# Patient Record
Sex: FEMALE | Race: WHITE | Employment: FULL TIME
[De-identification: names, ages, dates, MRNs, and addresses within clinical notes are randomized per-mention and may not be internally consistent; named-entity substitution may affect disease eponyms.]

---

## 2024-04-01 ENCOUNTER — APPOINTMENT (OUTPATIENT)
Facility: HOSPITAL | Age: 54
DRG: 623 | End: 2024-04-01
Payer: COMMERCIAL

## 2024-04-01 ENCOUNTER — HOSPITAL ENCOUNTER (EMERGENCY)
Facility: HOSPITAL | Age: 54
Discharge: HOME | DRG: 623 | End: 2024-04-01
Attending: EMERGENCY MEDICINE
Payer: COMMERCIAL

## 2024-04-01 VITALS
HEIGHT: 62 IN | SYSTOLIC BLOOD PRESSURE: 114 MMHG | HEART RATE: 98 BPM | WEIGHT: 220 LBS | RESPIRATION RATE: 18 BRPM | DIASTOLIC BLOOD PRESSURE: 77 MMHG | TEMPERATURE: 97.7 F | OXYGEN SATURATION: 97 % | BODY MASS INDEX: 40.48 KG/M2

## 2024-04-01 DIAGNOSIS — L02.91 ABSCESS: Primary | ICD-10-CM

## 2024-04-01 LAB
ALBUMIN SERPL-MCNC: 2.7 G/DL (ref 3.5–5)
ALBUMIN/GLOB SERPL: 0.8 (ref 1.1–2.2)
ALP SERPL-CCNC: 127 U/L (ref 45–117)
ALT SERPL-CCNC: 44 U/L (ref 12–78)
ANION GAP SERPL CALC-SCNC: 10 MMOL/L (ref 5–15)
AST SERPL-CCNC: 33 U/L (ref 15–37)
BASOPHILS # BLD: 0 K/UL (ref 0–0.1)
BASOPHILS NFR BLD: 0 % (ref 0–1)
BILIRUB SERPL-MCNC: 0.7 MG/DL (ref 0.2–1)
BUN SERPL-MCNC: 17 MG/DL (ref 6–20)
BUN/CREAT SERPL: 21 (ref 12–20)
CALCIUM SERPL-MCNC: 9.2 MG/DL (ref 8.5–10.1)
CHLORIDE SERPL-SCNC: 101 MMOL/L (ref 97–108)
CO2 SERPL-SCNC: 24 MMOL/L (ref 21–32)
CREAT SERPL-MCNC: 0.81 MG/DL (ref 0.55–1.02)
DIFFERENTIAL METHOD BLD: ABNORMAL
EOSINOPHIL # BLD: 0 K/UL (ref 0–0.4)
EOSINOPHIL NFR BLD: 0 % (ref 0–7)
ERYTHROCYTE [DISTWIDTH] IN BLOOD BY AUTOMATED COUNT: 12.6 % (ref 11.5–14.5)
GLOBULIN SER CALC-MCNC: 3.6 G/DL (ref 2–4)
GLUCOSE SERPL-MCNC: 442 MG/DL (ref 65–100)
HCT VFR BLD AUTO: 42 % (ref 35–47)
HGB BLD-MCNC: 14.7 G/DL (ref 11.5–16)
IMM GRANULOCYTES # BLD AUTO: 0 K/UL
IMM GRANULOCYTES NFR BLD AUTO: 0 %
LACTATE SERPL-SCNC: 1.2 MMOL/L (ref 0.4–2)
LYMPHOCYTES # BLD: 1.4 K/UL (ref 0.8–3.5)
LYMPHOCYTES NFR BLD: 16 % (ref 12–49)
MCH RBC QN AUTO: 30.3 PG (ref 26–34)
MCHC RBC AUTO-ENTMCNC: 35 G/DL (ref 30–36.5)
MCV RBC AUTO: 86.6 FL (ref 80–99)
MONOCYTES # BLD: 1.2 K/UL (ref 0–1)
MONOCYTES NFR BLD: 14 % (ref 5–13)
NEUTS BAND NFR BLD MANUAL: 5 % (ref 0–6)
NEUTS SEG # BLD: 6.2 K/UL (ref 1.8–8)
NEUTS SEG NFR BLD: 65 % (ref 32–75)
NRBC # BLD: 0 K/UL (ref 0–0.01)
NRBC BLD-RTO: 0 PER 100 WBC
PLATELET # BLD AUTO: 308 K/UL (ref 150–400)
PMV BLD AUTO: 10.6 FL (ref 8.9–12.9)
POTASSIUM SERPL-SCNC: 4.1 MMOL/L (ref 3.5–5.1)
PROT SERPL-MCNC: 6.3 G/DL (ref 6.4–8.2)
RBC # BLD AUTO: 4.85 M/UL (ref 3.8–5.2)
RBC MORPH BLD: ABNORMAL
SODIUM SERPL-SCNC: 135 MMOL/L (ref 136–145)
WBC # BLD AUTO: 8.8 K/UL (ref 3.6–11)

## 2024-04-01 PROCEDURE — 96360 HYDRATION IV INFUSION INIT: CPT

## 2024-04-01 PROCEDURE — 74177 CT ABD & PELVIS W/CONTRAST: CPT

## 2024-04-01 PROCEDURE — 99285 EMERGENCY DEPT VISIT HI MDM: CPT

## 2024-04-01 PROCEDURE — 2580000003 HC RX 258: Performed by: EMERGENCY MEDICINE

## 2024-04-01 PROCEDURE — 6360000004 HC RX CONTRAST MEDICATION: Performed by: EMERGENCY MEDICINE

## 2024-04-01 PROCEDURE — 83605 ASSAY OF LACTIC ACID: CPT

## 2024-04-01 PROCEDURE — 85025 COMPLETE CBC W/AUTO DIFF WBC: CPT

## 2024-04-01 PROCEDURE — 10061 I&D ABSCESS COMP/MULTIPLE: CPT

## 2024-04-01 PROCEDURE — 80053 COMPREHEN METABOLIC PANEL: CPT

## 2024-04-01 PROCEDURE — 6370000000 HC RX 637 (ALT 250 FOR IP): Performed by: EMERGENCY MEDICINE

## 2024-04-01 PROCEDURE — 2500000003 HC RX 250 WO HCPCS: Performed by: EMERGENCY MEDICINE

## 2024-04-01 PROCEDURE — 0Y913ZZ DRAINAGE OF LEFT BUTTOCK, PERCUTANEOUS APPROACH: ICD-10-PCS | Performed by: EMERGENCY MEDICINE

## 2024-04-01 PROCEDURE — 36415 COLL VENOUS BLD VENIPUNCTURE: CPT

## 2024-04-01 RX ORDER — AMOXICILLIN AND CLAVULANATE POTASSIUM 875; 125 MG/1; MG/1
1 TABLET, FILM COATED ORAL 2 TIMES DAILY
Status: ON HOLD | COMMUNITY
End: 2024-04-05 | Stop reason: HOSPADM

## 2024-04-01 RX ORDER — DOXYCYCLINE HYCLATE 100 MG
100 TABLET ORAL 2 TIMES DAILY
Status: ON HOLD | COMMUNITY
End: 2024-04-05 | Stop reason: HOSPADM

## 2024-04-01 RX ORDER — LIDOCAINE HYDROCHLORIDE 10 MG/ML
5 INJECTION, SOLUTION EPIDURAL; INFILTRATION; INTRACAUDAL; PERINEURAL ONCE
Status: COMPLETED | OUTPATIENT
Start: 2024-04-01 | End: 2024-04-01

## 2024-04-01 RX ORDER — 0.9 % SODIUM CHLORIDE 0.9 %
1000 INTRAVENOUS SOLUTION INTRAVENOUS ONCE
Status: COMPLETED | OUTPATIENT
Start: 2024-04-01 | End: 2024-04-01

## 2024-04-01 RX ORDER — NAPROXEN 375 MG/1
375 TABLET ORAL 2 TIMES DAILY WITH MEALS
COMMUNITY

## 2024-04-01 RX ADMIN — INSULIN HUMAN 8 UNITS: 100 INJECTION, SOLUTION PARENTERAL at 11:55

## 2024-04-01 RX ADMIN — LIDOCAINE HYDROCHLORIDE 5 ML: 10 INJECTION, SOLUTION EPIDURAL; INFILTRATION; INTRACAUDAL; PERINEURAL at 09:18

## 2024-04-01 RX ADMIN — IOPAMIDOL 100 ML: 755 INJECTION, SOLUTION INTRAVENOUS at 11:35

## 2024-04-01 RX ADMIN — SODIUM CHLORIDE 1000 ML: 9 INJECTION, SOLUTION INTRAVENOUS at 11:18

## 2024-04-01 ASSESSMENT — PAIN - FUNCTIONAL ASSESSMENT
PAIN_FUNCTIONAL_ASSESSMENT: 0-10
PAIN_FUNCTIONAL_ASSESSMENT: ACTIVITIES ARE NOT PREVENTED

## 2024-04-01 ASSESSMENT — PAIN DESCRIPTION - FREQUENCY: FREQUENCY: CONTINUOUS

## 2024-04-01 ASSESSMENT — PAIN DESCRIPTION - DESCRIPTORS
DESCRIPTORS: STABBING;SHARP
DESCRIPTORS: STABBING

## 2024-04-01 ASSESSMENT — PAIN DESCRIPTION - LOCATION
LOCATION: BUTTOCKS
LOCATION: BUTTOCKS

## 2024-04-01 ASSESSMENT — PAIN DESCRIPTION - ONSET: ONSET: ON-GOING

## 2024-04-01 ASSESSMENT — PAIN DESCRIPTION - ORIENTATION
ORIENTATION: LEFT
ORIENTATION: LEFT

## 2024-04-01 ASSESSMENT — PAIN SCALES - GENERAL
PAINLEVEL_OUTOF10: 7
PAINLEVEL_OUTOF10: 9

## 2024-04-01 ASSESSMENT — PAIN DESCRIPTION - PAIN TYPE: TYPE: ACUTE PAIN

## 2024-04-01 NOTE — ED TRIAGE NOTES
[facility] 15. Pt ambulatory to room. Last Monday-Wednesday pt started to have flu like symptoms. Pt noticed an abscess that kept growing on her left buttocks that is draining . On Friday the pt went to an urgent care and was given antibiotics and told to make an appointment for a later time.

## 2024-04-01 NOTE — ED PROVIDER NOTES
[place].  Sampson Adkins MD  12:43 PM      DISPOSITION/PLAN   DISPOSITION        PATIENT REFERRED TO:  No follow-up provider specified.    DISCHARGE MEDICATIONS:  New Prescriptions    No medications on file         (Please note that portions of this note were completed with a voice recognition program.  Efforts were made to edit the dictations but occasionally words are mis-transcribed.)    Sampson Adkins MD (electronically signed)  Emergency Attending Physician / Physician Assistant / Nurse Practitioner             Sampson Adkins MD  04/01/24 4007

## 2024-04-03 ENCOUNTER — APPOINTMENT (OUTPATIENT)
Facility: HOSPITAL | Age: 54
DRG: 623 | End: 2024-04-03
Payer: COMMERCIAL

## 2024-04-03 ENCOUNTER — HOSPITAL ENCOUNTER (INPATIENT)
Facility: HOSPITAL | Age: 54
LOS: 2 days | Discharge: HOME | DRG: 623 | End: 2024-04-05
Attending: STUDENT IN AN ORGANIZED HEALTH CARE EDUCATION/TRAINING PROGRAM | Admitting: INTERNAL MEDICINE
Payer: COMMERCIAL

## 2024-04-03 DIAGNOSIS — E11.65 TYPE 2 DIABETES MELLITUS WITH HYPERGLYCEMIA (HCC): ICD-10-CM

## 2024-04-03 DIAGNOSIS — L03.119 CELLULITIS AND ABSCESS OF LEG: Primary | ICD-10-CM

## 2024-04-03 DIAGNOSIS — L02.419 CELLULITIS AND ABSCESS OF LEG: Primary | ICD-10-CM

## 2024-04-03 PROBLEM — L98.429 SACRAL ULCER (HCC): Status: ACTIVE | Noted: 2024-04-03

## 2024-04-03 PROBLEM — L02.91 ABSCESS: Status: ACTIVE | Noted: 2024-04-03

## 2024-04-03 PROBLEM — R73.9 HYPERGLYCEMIA: Status: ACTIVE | Noted: 2024-04-03

## 2024-04-03 LAB
ALBUMIN SERPL-MCNC: 2.8 G/DL (ref 3.5–5)
ALBUMIN/GLOB SERPL: 0.8 (ref 1.1–2.2)
ALP SERPL-CCNC: 110 U/L (ref 45–117)
ALT SERPL-CCNC: 51 U/L (ref 12–78)
ANION GAP SERPL CALC-SCNC: 10 MMOL/L (ref 5–15)
AST SERPL-CCNC: 33 U/L (ref 15–37)
BASOPHILS # BLD: 0 K/UL (ref 0–0.1)
BASOPHILS NFR BLD: 0 % (ref 0–1)
BILIRUB SERPL-MCNC: 0.5 MG/DL (ref 0.2–1)
BUN SERPL-MCNC: 15 MG/DL (ref 6–20)
BUN/CREAT SERPL: 17 (ref 12–20)
CALCIUM SERPL-MCNC: 8.9 MG/DL (ref 8.5–10.1)
CHLORIDE SERPL-SCNC: 99 MMOL/L (ref 97–108)
CO2 SERPL-SCNC: 25 MMOL/L (ref 21–32)
CREAT SERPL-MCNC: 0.88 MG/DL (ref 0.55–1.02)
DIFFERENTIAL METHOD BLD: ABNORMAL
EOSINOPHIL # BLD: 0.1 K/UL (ref 0–0.4)
EOSINOPHIL NFR BLD: 1 % (ref 0–7)
ERYTHROCYTE [DISTWIDTH] IN BLOOD BY AUTOMATED COUNT: 12.5 % (ref 11.5–14.5)
GLOBULIN SER CALC-MCNC: 3.6 G/DL (ref 2–4)
GLUCOSE BLD STRIP.AUTO-MCNC: 267 MG/DL (ref 65–117)
GLUCOSE BLD STRIP.AUTO-MCNC: 280 MG/DL (ref 65–117)
GLUCOSE BLD STRIP.AUTO-MCNC: 295 MG/DL (ref 65–117)
GLUCOSE SERPL-MCNC: 401 MG/DL (ref 65–100)
HCT VFR BLD AUTO: 45.9 % (ref 35–47)
HGB BLD-MCNC: 16 G/DL (ref 11.5–16)
IMM GRANULOCYTES # BLD AUTO: 0 K/UL
IMM GRANULOCYTES NFR BLD AUTO: 0 %
LACTATE SERPL-SCNC: 1.1 MMOL/L (ref 0.4–2)
LYMPHOCYTES # BLD: 2.4 K/UL (ref 0.8–3.5)
LYMPHOCYTES NFR BLD: 28 % (ref 12–49)
MCH RBC QN AUTO: 30.2 PG (ref 26–34)
MCHC RBC AUTO-ENTMCNC: 34.9 G/DL (ref 30–36.5)
MCV RBC AUTO: 86.6 FL (ref 80–99)
METAMYELOCYTES NFR BLD MANUAL: 1 %
MONOCYTES # BLD: 0.3 K/UL (ref 0–1)
MONOCYTES NFR BLD: 4 % (ref 5–13)
MYELOCYTES NFR BLD MANUAL: 1 %
NEUTS BAND NFR BLD MANUAL: 5 % (ref 0–6)
NEUTS SEG # BLD: 5.5 K/UL (ref 1.8–8)
NEUTS SEG NFR BLD: 60 % (ref 32–75)
NRBC # BLD: 0 K/UL (ref 0–0.01)
NRBC BLD-RTO: 0 PER 100 WBC
PLATELET # BLD AUTO: 339 K/UL (ref 150–400)
PMV BLD AUTO: 10.5 FL (ref 8.9–12.9)
POTASSIUM SERPL-SCNC: 4.3 MMOL/L (ref 3.5–5.1)
PROT SERPL-MCNC: 6.4 G/DL (ref 6.4–8.2)
RBC # BLD AUTO: 5.3 M/UL (ref 3.8–5.2)
RBC MORPH BLD: ABNORMAL
SERVICE CMNT-IMP: ABNORMAL
SODIUM SERPL-SCNC: 134 MMOL/L (ref 136–145)
WBC # BLD AUTO: 8.4 K/UL (ref 3.6–11)

## 2024-04-03 PROCEDURE — 96375 TX/PRO/DX INJ NEW DRUG ADDON: CPT

## 2024-04-03 PROCEDURE — 85025 COMPLETE CBC W/AUTO DIFF WBC: CPT

## 2024-04-03 PROCEDURE — 96374 THER/PROPH/DIAG INJ IV PUSH: CPT

## 2024-04-03 PROCEDURE — 96365 THER/PROPH/DIAG IV INF INIT: CPT

## 2024-04-03 PROCEDURE — 36415 COLL VENOUS BLD VENIPUNCTURE: CPT

## 2024-04-03 PROCEDURE — 2580000003 HC RX 258: Performed by: INTERNAL MEDICINE

## 2024-04-03 PROCEDURE — 82962 GLUCOSE BLOOD TEST: CPT

## 2024-04-03 PROCEDURE — 83036 HEMOGLOBIN GLYCOSYLATED A1C: CPT

## 2024-04-03 PROCEDURE — 6360000002 HC RX W HCPCS: Performed by: INTERNAL MEDICINE

## 2024-04-03 PROCEDURE — 99285 EMERGENCY DEPT VISIT HI MDM: CPT

## 2024-04-03 PROCEDURE — 74177 CT ABD & PELVIS W/CONTRAST: CPT

## 2024-04-03 PROCEDURE — 96367 TX/PROPH/DG ADDL SEQ IV INF: CPT

## 2024-04-03 PROCEDURE — 6360000004 HC RX CONTRAST MEDICATION: Performed by: STUDENT IN AN ORGANIZED HEALTH CARE EDUCATION/TRAINING PROGRAM

## 2024-04-03 PROCEDURE — 87205 SMEAR GRAM STAIN: CPT

## 2024-04-03 PROCEDURE — 6370000000 HC RX 637 (ALT 250 FOR IP): Performed by: INTERNAL MEDICINE

## 2024-04-03 PROCEDURE — 6360000002 HC RX W HCPCS: Performed by: STUDENT IN AN ORGANIZED HEALTH CARE EDUCATION/TRAINING PROGRAM

## 2024-04-03 PROCEDURE — 83605 ASSAY OF LACTIC ACID: CPT

## 2024-04-03 PROCEDURE — 80053 COMPREHEN METABOLIC PANEL: CPT

## 2024-04-03 PROCEDURE — 87070 CULTURE OTHR SPECIMN AEROBIC: CPT

## 2024-04-03 PROCEDURE — 1100000000 HC RM PRIVATE

## 2024-04-03 PROCEDURE — 87040 BLOOD CULTURE FOR BACTERIA: CPT

## 2024-04-03 PROCEDURE — 2580000003 HC RX 258: Performed by: STUDENT IN AN ORGANIZED HEALTH CARE EDUCATION/TRAINING PROGRAM

## 2024-04-03 RX ORDER — MORPHINE SULFATE 4 MG/ML
4 INJECTION, SOLUTION INTRAMUSCULAR; INTRAVENOUS
Status: COMPLETED | OUTPATIENT
Start: 2024-04-03 | End: 2024-04-03

## 2024-04-03 RX ORDER — CLINDAMYCIN PHOSPHATE 900 MG/50ML
900 INJECTION, SOLUTION INTRAVENOUS EVERY 8 HOURS
Status: DISCONTINUED | OUTPATIENT
Start: 2024-04-03 | End: 2024-04-03

## 2024-04-03 RX ORDER — OXYCODONE HYDROCHLORIDE 5 MG/1
5 TABLET ORAL EVERY 4 HOURS PRN
Status: DISCONTINUED | OUTPATIENT
Start: 2024-04-03 | End: 2024-04-05 | Stop reason: HOSPADM

## 2024-04-03 RX ORDER — INSULIN LISPRO 100 [IU]/ML
0-8 INJECTION, SOLUTION INTRAVENOUS; SUBCUTANEOUS
Status: DISCONTINUED | OUTPATIENT
Start: 2024-04-03 | End: 2024-04-05 | Stop reason: HOSPADM

## 2024-04-03 RX ORDER — INSULIN LISPRO 100 [IU]/ML
0-4 INJECTION, SOLUTION INTRAVENOUS; SUBCUTANEOUS NIGHTLY
Status: DISCONTINUED | OUTPATIENT
Start: 2024-04-03 | End: 2024-04-05 | Stop reason: HOSPADM

## 2024-04-03 RX ORDER — DEXTROSE MONOHYDRATE 100 MG/ML
INJECTION, SOLUTION INTRAVENOUS CONTINUOUS PRN
Status: DISCONTINUED | OUTPATIENT
Start: 2024-04-03 | End: 2024-04-05 | Stop reason: HOSPADM

## 2024-04-03 RX ORDER — MORPHINE SULFATE 2 MG/ML
2 INJECTION, SOLUTION INTRAMUSCULAR; INTRAVENOUS EVERY 4 HOURS PRN
Status: DISCONTINUED | OUTPATIENT
Start: 2024-04-03 | End: 2024-04-05 | Stop reason: HOSPADM

## 2024-04-03 RX ADMIN — PIPERACILLIN AND TAZOBACTAM 4500 MG: 4; .5 INJECTION, POWDER, FOR SOLUTION INTRAVENOUS at 11:45

## 2024-04-03 RX ADMIN — HUMAN INSULIN 10 UNITS: 100 INJECTION, SUSPENSION SUBCUTANEOUS at 17:43

## 2024-04-03 RX ADMIN — IOPAMIDOL 100 ML: 755 INJECTION, SOLUTION INTRAVENOUS at 12:57

## 2024-04-03 RX ADMIN — PIPERACILLIN AND TAZOBACTAM 3375 MG: 3; .375 INJECTION, POWDER, LYOPHILIZED, FOR SOLUTION INTRAVENOUS at 17:43

## 2024-04-03 RX ADMIN — VANCOMYCIN HYDROCHLORIDE 1250 MG: 1.25 INJECTION, POWDER, LYOPHILIZED, FOR SOLUTION INTRAVENOUS at 13:54

## 2024-04-03 RX ADMIN — MORPHINE SULFATE 2 MG: 2 INJECTION, SOLUTION INTRAMUSCULAR; INTRAVENOUS at 18:42

## 2024-04-03 RX ADMIN — CLINDAMYCIN PHOSPHATE 900 MG: 900 INJECTION, SOLUTION INTRAVENOUS at 12:29

## 2024-04-03 RX ADMIN — MORPHINE SULFATE 4 MG: 4 INJECTION INTRAVENOUS at 11:45

## 2024-04-03 RX ADMIN — VANCOMYCIN HYDROCHLORIDE 1250 MG: 1.25 INJECTION, POWDER, LYOPHILIZED, FOR SOLUTION INTRAVENOUS at 13:53

## 2024-04-03 RX ADMIN — INSULIN LISPRO 4 UNITS: 100 INJECTION, SOLUTION INTRAVENOUS; SUBCUTANEOUS at 17:44

## 2024-04-03 ASSESSMENT — PAIN DESCRIPTION - ORIENTATION
ORIENTATION: LEFT
ORIENTATION: LEFT

## 2024-04-03 ASSESSMENT — PAIN SCALES - GENERAL
PAINLEVEL_OUTOF10: 0
PAINLEVEL_OUTOF10: 8
PAINLEVEL_OUTOF10: 8

## 2024-04-03 ASSESSMENT — PAIN DESCRIPTION - LOCATION
LOCATION: BUTTOCKS
LOCATION: BUTTOCKS

## 2024-04-03 ASSESSMENT — PAIN DESCRIPTION - DESCRIPTORS
DESCRIPTORS: BURNING
DESCRIPTORS: BURNING

## 2024-04-03 ASSESSMENT — PAIN DESCRIPTION - PAIN TYPE: TYPE: ACUTE PAIN

## 2024-04-03 ASSESSMENT — PAIN - FUNCTIONAL ASSESSMENT: PAIN_FUNCTIONAL_ASSESSMENT: 0-10
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
## 2024-04-03 NOTE — ED TRIAGE NOTES
Ambulatory to treatment area with slow steady gait reports seen here on Monday had abscess drained on buttock nr=eeds packing removed and concerned still having a lot of pain

## 2024-04-03 NOTE — ED NOTES
TRANSFER - OUT REPORT:    Verbal report given to TAMIR Hancock on Sonal Cottrell  being transferred to [facility] ED for routine progression of patient care       Report consisted of patient's Situation, Background, Assessment and   Recommendations(SBAR).     Information from the following report(s) Nurse Handoff Report, ED Encounter Summary, and ED SBAR was reviewed with the receiving nurse.    [place] Fall Assessment:    Presents to emergency department  because of falls (Syncope, seizure, or loss of consciousness): No  Age > 70: No  Altered Mental Status, Intoxication with alcohol or substance confusion (Disorientation, impaired judgment, poor safety awaremess, or inability to follow instructions): No  Impaired Mobility: Ambulates or transfers with assistive devices or assistance; Unable to ambulate or transer.: No  Nursing Judgement: Yes          Lines:   Peripheral IV 04/03/24 Right Antecubital (Active)       Peripheral IV 04/03/24 Posterior;Right Hand (Active)        Opportunity for questions and clarification was provided.      Patient transported with:  Monitor, Vanc 100mL/hr

## 2024-04-03 NOTE — ED PROVIDER NOTES
[facility] EMERGENCY DEPT  EMERGENCY DEPARTMENT ENCOUNTER      Pt Name: Sonal Cottrell  MRN: [id number]  Birthdate 1970  Date of evaluation: 4/3/2024  Provider: Kimberly Acosta MD    CHIEF COMPLAINT       Chief Complaint   Patient presents with    packing removal         HISTORY OF PRESENT ILLNESS    Review of Medical Records: Reviewed ED note, CT scan, blood work from 4/1/2024.  Notable for CT scan with extensive subcutaneous emphysema throughout left buttocks, no focal fluid collection.  CBC with white count 8.8, Lactic acid 1.2, blood glucose level of 442 and patient unaware of diabetes and declining initiation of metformin for diabetes.    Nursing Triage Notes were reviewed.    HPI    Sonal Cottrell is a 53 y.o. female with a history of recent left buttocks abscess status post I&D on 4/1/2024 who presents to the emergency department for reevaluation and removal of packing material.  Patient reports that she has still had severe pain and drainage from the left buttocks where she had an I&D in the ER on 4/1/2024.  Returns today for removal of packing material.  No packing material in place.  Large wound over the left buttocks with large area of black discoloration and deep wound, see below.  Patient states that she has continued to have foul-smelling purulent drainage.  Denies any associated fevers or chills.  Reports she took naproxen this morning but is still having severe pain.        PAST MEDICAL HISTORY   No past medical history on file.      SURGICAL HISTORY     No past surgical history on file.      CURRENT MEDICATIONS       Previous Medications    AMOXICILLIN-CLAVULANATE (AUGMENTIN) 875-125 MG PER TABLET    Take 1 tablet by mouth 2 times daily    DOXYCYCLINE HYCLATE (VIBRA-TABS) 100 MG TABLET    Take 1 tablet by mouth 2 times daily    NAPROXEN (NAPROSYN) 375 MG TABLET    Take 1 tablet by mouth 2 times daily (with meals)       ALLERGIES     Sulfa antibiotics    FAMILY HISTORY     No family history  limits   COMPREHENSIVE METABOLIC PANEL - Abnormal; Notable for the following components:    Sodium 134 (*)     Glucose 401 (*)     Albumin 2.8 (*)     Albumin/Globulin Ratio 0.8 (*)     All other components within normal limits   CULTURE, BLOOD 1   CULTURE, BLOOD 2   LACTIC ACID       All other labs were within normal range or not returned as of this dictation.    EMERGENCY DEPARTMENT COURSE and DIFFERENTIAL DIAGNOSIS/MDM:   Vitals:    Vitals:    04/03/24 1215 04/03/24 1230 04/03/24 1300 04/03/24 1315   BP: 129/77 132/64 (!) 150/86 (!) 154/79   Pulse:       Resp:       Temp:       TempSrc:       SpO2:  96% 97% 94%   Height:               Medical Decision Making  Amount and/or Complexity of Data Reviewed  Labs: ordered.  Radiology: ordered.    Risk  Prescription drug management.      Abscess s/p I&D, Concern for Necrotizing Soft Tissue Infection  Hemodynamically stable and complaining of pain out of proportion to original abscess.  Patient had incision and drainage on 4/1/2024.  Reports she has been compliant with her prescribed augmentin and doxycycline.  Has continued to have severe pain despite this.  Denies any systemic symptoms.  Exam  concerning for possible necrotizing infection.  Discussed with general surgery, agree with plan for transfer to [facility] for general surgery evaluation. Recommend admission to hospitali service and general surgery will evaluate patient for consideration of surgical intervention after arrival to [facility].      REASSESSMENT   MEDICATIONS GIVEN:   Medications   clindamycin (CLEOCIN) 900 mg in dextrose 5 % 50 mL IVPB (900 mg IntraVENous New Bag 4/3/24 1229)   vancomycin (VANCOCIN) 1,250 mg in sodium chloride 0.9 % 250 mL IVPB ([id number]) (has no administration in time range)     And   vancomycin (VANCOCIN) 1,250 mg in sodium chloride 0.9 % 250 mL IVPB ([id number]) (1,250 mg IntraVENous New Bag 4/3/24 1353)   piperacillin-tazobactam (ZOSYN) 4,500 mg in sodium chloride 0.9 % 100 mL  hospitali service and general surgery will evaluate patient for consideration of surgical intervention after arrival to [facility].         (Please note that portions of this note were completed with a voice recognition program.  Efforts were made to edit the dictations but occasionally words are mis-transcribed.)    Kimberly Acosta MD (electronically signed)  Emergency Attending Physician       Kimberly Acosta MD  04/03/24 4103

## 2024-04-03 NOTE — H&P
[facility]  [address] [place]  [address] [phone]    Hospital Medicine Admission History and Physical      NAME:  Sonal Cottrell   :   1970   MRN:  [id number]     PCP:  None, None     Date of service:  4/3/2024         Subjective:     CHIEF COMPLAINT: Buttock ulcer    HISTORY OF PRESENT ILLNESS:     Ms. Cottrell is a 53 y.o.   female who is admitted with buttock ulcer.  Ms. Cottrell with past medical history of ITPpresented to ER complaining of buttock ulcer.  Patient was seen in ER on  due to buttock abscess, which was drained and packed and sent home with antibiotics, but wound culture was not sent.  Patient came today to ER to remove the packing and noted to have necrotic tissue with deep wound. Patient states that she has continued to have foul-smelling purulent drainage.  Patient denies fever or chills.      No past medical history on file.     No past surgical history on file.    Social History     Tobacco Use    Smoking status: Not on file    Smokeless tobacco: Not on file   Substance Use Topics    Alcohol use: Not on file        No family history on file.     Allergies   Allergen Reactions    Sulfa Antibiotics Hives        Prior to Admission medications    Medication Sig Start Date End Date Taking? Authorizing Provider   doxycycline hyclate (VIBRA-TABS) 100 MG tablet Take 1 tablet by mouth 2 times daily    Antonio Alcala MD   amoxicillin-clavulanate (AUGMENTIN) 875-125 MG per tablet Take 1 tablet by mouth 2 times daily    Antonio Alcala MD   naproxen (NAPROSYN) 375 MG tablet Take 1 tablet by mouth 2 times daily (with meals)    ProviderAntonio MD         Review of Systems:  (bold if positive, if negative)    Gen:  Eyes:  ENT:  CVS:  Pulm:  GI:  :  MS:  Skin:  abscess, wound,Psych:  Endo:  Hem:  Renal:  Neuro:            Objective:      VITALS:    Vital signs reviewed; most recent are:    Vitals:    24 1445   BP:  (!) 156/79   Pulse:    Resp:    Temp: 98.8 °F (37.1 °C)   SpO2: 96%     SpO2 Readings from Last 6 Encounters:   04/03/24 96%   04/01/24 97%        No intake or output data in the 24 hours ending 04/03/24 5209         Exam:     Physical Exam:    Gen:  Well-developed, well-nourished, in no acute distress  HEENT:  Pink conjunctivae, PERRL, hearing intact to voice, moist mucous membranes  Neck:  Supple, without masses, thyroid non-tender  Resp:  No accessory muscle use, clear breath sounds without wheezes rales or rhonchi  Card:  No murmurs, normal S1, S2 without thrills, bruits or peripheral edema  Abd:  Soft, non-tender, non-distended, normoactive bowel sounds are present, no palpable organomegaly and no detectable hernias  Lymph:  No cervical or inguinal adenopathy  Musc:  No cyanosis or clubbing  Skin: Large buttock ulcer left side  Neuro:  Cranial nerves are grossly intact, no focal motor weakness, follows commands appropriately  Psych:  Good insight, oriented to person, place and time, alert       Labs:    Recent Labs     04/03/24  1140   WBC 8.4   HGB 16.0   HCT 45.9        Recent Labs     04/03/24  1140   *   K 4.3   CL 99   CO2 25   BUN 15   ALT 51     No results found for: \"GLUCPOC\"  No results for input(s): \"PH\", \"PCO2\", \"PO2\", \"HCO3\", \"FIO2\" in the last 72 hours.  No results for input(s): \"INR\" in the last 72 hours.    Telemetry reviewed:          Assessment/Plan:    Left buttock wound.  S/p I&D on 4/1.  Check wound culture.  Continue broad-spectrum antibiotics.  Consult neurosurgery and wound care specialist.    2.  Hyperglycemia.  Likely undiagnosed DM.  Patient does not know about high blood sugar until disease visits to ER.  Check A1c and start NPH and cover with sliding scale.  Diabetic treatment nurse consult    3.  Obesity.  Would benefit from weight loss      * No resolved hospital problems. *       Previous medical records reviewed     Risk of deterioration: high      Total time spent

## 2024-04-03 NOTE — PROGRESS NOTES
[facility] Dosing Services: Antimicrobial Stewardship Daily Doc  Consult for antibiotic dosing of vancomycin by Dr. Montiel  Indication: SSTI (Left buttock wound. S/p I&D on 4/1)  Day of Therapy: 1    Ht Readings from Last 1 Encounters:   04/03/24 1.575 m (5' 2\")        Wt Readings from Last 1 Encounters:   04/01/24 99.8 kg (220 lb)      Vancomycin therapy:  Loading dose: Vancomycin 2500 mg x1 dose now/given  Maintenance dose: Vancomycin 1250 mg IV every 18 hours   Dose calculated to approximate a           a. Target AUC/GALINDO of 400-600          b. Trough of 15-20 mcg/mL   Last level:  mcg/mL  Plan: WBC 8.4, Scr 0.88, afebrile. Start vanc 1.25g q18h (~auc  527). Obtain level in 48h. Scr x3 daily ordered.       Non-Kinetic Antimicrobial Dosing Regimen:   Current Regimen:  Zosyn 3.375g q8h  Recommendation: continue      Other Antimicrobial   (not dosed by pharmacist)    Cultures 4/3: blood x2:    Serum Creatinine Lab Results   Component Value Date/Time    CREATININE 0.88 04/03/2024 11:40 AM          Creatinine Clearance Estimated Creatinine Clearance: 82 mL/min (based on SCr of 0.88 mg/dL).     Temp Temp: 98.8 °F (37.1 °C)       WBC Lab Results   Component Value Date/Time    WBC 8.4 04/03/2024 11:40 AM          Procalcitonin No results found for: \"PROCAL\"   For Antifungals, Metronidazole and Nafcillin: Lab Results   Component Value Date/Time    ALT 51 04/03/2024 11:40 AM    AST 33 04/03/2024 11:40 AM        Pharmacist: Stephani Crowder, PharmD, BCPS  [phone]

## 2024-04-04 ENCOUNTER — ANESTHESIA (OUTPATIENT)
Facility: HOSPITAL | Age: 54
End: 2024-04-04
Payer: COMMERCIAL

## 2024-04-04 ENCOUNTER — ANESTHESIA EVENT (OUTPATIENT)
Facility: HOSPITAL | Age: 54
End: 2024-04-04
Payer: COMMERCIAL

## 2024-04-04 PROBLEM — E88.09 HYPOALBUMINEMIA: Status: ACTIVE | Noted: 2024-04-04

## 2024-04-04 PROBLEM — S31.809A GLUTEAL CLEFT WOUND: Status: ACTIVE | Noted: 2024-04-04

## 2024-04-04 PROBLEM — K76.0 HEPATIC STEATOSIS: Status: ACTIVE | Noted: 2024-04-04

## 2024-04-04 PROBLEM — E66.01 MORBID OBESITY (HCC): Status: ACTIVE | Noted: 2024-04-04

## 2024-04-04 PROBLEM — L03.119 CELLULITIS AND ABSCESS OF LEG: Status: ACTIVE | Noted: 2024-04-04

## 2024-04-04 PROBLEM — E11.9 DIABETES MELLITUS (HCC): Status: ACTIVE | Noted: 2024-04-04

## 2024-04-04 PROBLEM — Z86.2 HISTORY OF ITP: Status: ACTIVE | Noted: 2024-04-04

## 2024-04-04 PROBLEM — L02.419 CELLULITIS AND ABSCESS OF LEG: Status: ACTIVE | Noted: 2024-04-04

## 2024-04-04 PROBLEM — L02.91 ABSCESS: Status: RESOLVED | Noted: 2024-04-03 | Resolved: 2024-04-04

## 2024-04-04 PROBLEM — L98.429 SACRAL ULCER (HCC): Status: RESOLVED | Noted: 2024-04-03 | Resolved: 2024-04-04

## 2024-04-04 LAB
-: NORMAL
CREAT SERPL-MCNC: 0.61 MG/DL (ref 0.55–1.02)
EST. AVERAGE GLUCOSE BLD GHB EST-MCNC: 318 MG/DL
GLUCOSE BLD STRIP.AUTO-MCNC: 280 MG/DL (ref 65–117)
GLUCOSE BLD STRIP.AUTO-MCNC: 289 MG/DL (ref 65–117)
GLUCOSE BLD STRIP.AUTO-MCNC: 414 MG/DL (ref 65–117)
GLUCOSE BLD STRIP.AUTO-MCNC: 453 MG/DL (ref 65–117)
GLUCOSE BLD STRIP.AUTO-MCNC: 464 MG/DL (ref 65–117)
GLUCOSE BLD STRIP.AUTO-MCNC: 497 MG/DL (ref 65–117)
HAV IGM SER QL: NONREACTIVE
HBA1C MFR BLD: 12.7 % (ref 4–5.6)
HBV CORE IGM SER QL: NONREACTIVE
HBV SURFACE AG SER QL: <0.1 INDEX
HBV SURFACE AG SER QL: NEGATIVE
HCV AB SER IA-ACNC: <0.02 INDEX
HCV AB SERPL QL IA: NONREACTIVE
MAGNESIUM SERPL-MCNC: 2 MG/DL (ref 1.6–2.4)
PHOSPHATE SERPL-MCNC: 2.3 MG/DL (ref 2.6–4.7)
PROCALCITONIN SERPL-MCNC: 0.28 NG/ML
SERVICE CMNT-IMP: ABNORMAL
VANCOMYCIN SERPL-MCNC: 11.4 UG/ML

## 2024-04-04 PROCEDURE — 6360000002 HC RX W HCPCS: Performed by: INTERNAL MEDICINE

## 2024-04-04 PROCEDURE — 6370000000 HC RX 637 (ALT 250 FOR IP)

## 2024-04-04 PROCEDURE — 2500000003 HC RX 250 WO HCPCS: Performed by: SURGERY

## 2024-04-04 PROCEDURE — 6360000002 HC RX W HCPCS: Performed by: NURSE ANESTHETIST, CERTIFIED REGISTERED

## 2024-04-04 PROCEDURE — 3600000012 HC SURGERY LEVEL 2 ADDTL 15MIN: Performed by: SURGERY

## 2024-04-04 PROCEDURE — 87102 FUNGUS ISOLATION CULTURE: CPT

## 2024-04-04 PROCEDURE — 3700000000 HC ANESTHESIA ATTENDED CARE: Performed by: SURGERY

## 2024-04-04 PROCEDURE — 3700000001 HC ADD 15 MINUTES (ANESTHESIA): Performed by: SURGERY

## 2024-04-04 PROCEDURE — 6370000000 HC RX 637 (ALT 250 FOR IP): Performed by: INTERNAL MEDICINE

## 2024-04-04 PROCEDURE — 1100000000 HC RM PRIVATE

## 2024-04-04 PROCEDURE — 7100000001 HC PACU RECOVERY - ADDTL 15 MIN: Performed by: SURGERY

## 2024-04-04 PROCEDURE — 82962 GLUCOSE BLOOD TEST: CPT

## 2024-04-04 PROCEDURE — C9290 INJ, BUPIVACAINE LIPOSOME: HCPCS | Performed by: SURGERY

## 2024-04-04 PROCEDURE — 2580000003 HC RX 258: Performed by: NURSE ANESTHETIST, CERTIFIED REGISTERED

## 2024-04-04 PROCEDURE — 2500000003 HC RX 250 WO HCPCS: Performed by: NURSE ANESTHETIST, CERTIFIED REGISTERED

## 2024-04-04 PROCEDURE — 88304 TISSUE EXAM BY PATHOLOGIST: CPT

## 2024-04-04 PROCEDURE — 80074 ACUTE HEPATITIS PANEL: CPT

## 2024-04-04 PROCEDURE — 6360000002 HC RX W HCPCS: Performed by: SURGERY

## 2024-04-04 PROCEDURE — 36415 COLL VENOUS BLD VENIPUNCTURE: CPT

## 2024-04-04 PROCEDURE — 6370000000 HC RX 637 (ALT 250 FOR IP): Performed by: NURSE PRACTITIONER

## 2024-04-04 PROCEDURE — 0JB90ZZ EXCISION OF BUTTOCK SUBCUTANEOUS TISSUE AND FASCIA, OPEN APPROACH: ICD-10-PCS | Performed by: SURGERY

## 2024-04-04 PROCEDURE — 2709999900 HC NON-CHARGEABLE SUPPLY: Performed by: SURGERY

## 2024-04-04 PROCEDURE — 2580000003 HC RX 258: Performed by: INTERNAL MEDICINE

## 2024-04-04 PROCEDURE — 80202 ASSAY OF VANCOMYCIN: CPT

## 2024-04-04 PROCEDURE — 83735 ASSAY OF MAGNESIUM: CPT

## 2024-04-04 PROCEDURE — 3600000002 HC SURGERY LEVEL 2 BASE: Performed by: SURGERY

## 2024-04-04 PROCEDURE — 7100000000 HC PACU RECOVERY - FIRST 15 MIN: Performed by: SURGERY

## 2024-04-04 PROCEDURE — 84100 ASSAY OF PHOSPHORUS: CPT

## 2024-04-04 PROCEDURE — 99223 1ST HOSP IP/OBS HIGH 75: CPT

## 2024-04-04 PROCEDURE — 82565 ASSAY OF CREATININE: CPT

## 2024-04-04 PROCEDURE — 84145 PROCALCITONIN (PCT): CPT

## 2024-04-04 PROCEDURE — 87205 SMEAR GRAM STAIN: CPT

## 2024-04-04 PROCEDURE — 87070 CULTURE OTHR SPECIMN AEROBIC: CPT

## 2024-04-04 PROCEDURE — 0JD90ZZ EXTRACTION OF BUTTOCK SUBCUTANEOUS TISSUE AND FASCIA, OPEN APPROACH: ICD-10-PCS | Performed by: SURGERY

## 2024-04-04 PROCEDURE — 2720000010 HC SURG SUPPLY STERILE: Performed by: SURGERY

## 2024-04-04 PROCEDURE — 2700000000 HC OXYGEN THERAPY PER DAY

## 2024-04-04 PROCEDURE — 87075 CULTR BACTERIA EXCEPT BLOOD: CPT

## 2024-04-04 RX ORDER — PROPOFOL 10 MG/ML
INJECTION, EMULSION INTRAVENOUS PRN
Status: DISCONTINUED | OUTPATIENT
Start: 2024-04-04 | End: 2024-04-04 | Stop reason: SDUPTHER

## 2024-04-04 RX ORDER — MIDAZOLAM HYDROCHLORIDE 1 MG/ML
INJECTION INTRAMUSCULAR; INTRAVENOUS PRN
Status: DISCONTINUED | OUTPATIENT
Start: 2024-04-04 | End: 2024-04-04 | Stop reason: SDUPTHER

## 2024-04-04 RX ORDER — DIPHENHYDRAMINE HYDROCHLORIDE 50 MG/ML
12.5 INJECTION INTRAMUSCULAR; INTRAVENOUS
Status: CANCELLED | OUTPATIENT
Start: 2024-04-04 | End: 2024-04-05

## 2024-04-04 RX ORDER — LIDOCAINE HYDROCHLORIDE 10 MG/ML
1 INJECTION, SOLUTION EPIDURAL; INFILTRATION; INTRACAUDAL; PERINEURAL
Status: CANCELLED | OUTPATIENT
Start: 2024-04-04 | End: 2024-04-05

## 2024-04-04 RX ORDER — INSULIN LISPRO 100 [IU]/ML
15 INJECTION, SOLUTION INTRAVENOUS; SUBCUTANEOUS ONCE
Status: COMPLETED | OUTPATIENT
Start: 2024-04-04 | End: 2024-04-04

## 2024-04-04 RX ORDER — NALOXONE HYDROCHLORIDE 0.4 MG/ML
INJECTION, SOLUTION INTRAMUSCULAR; INTRAVENOUS; SUBCUTANEOUS PRN
Status: CANCELLED | OUTPATIENT
Start: 2024-04-04

## 2024-04-04 RX ORDER — ROCURONIUM BROMIDE 10 MG/ML
INJECTION, SOLUTION INTRAVENOUS PRN
Status: DISCONTINUED | OUTPATIENT
Start: 2024-04-04 | End: 2024-04-04 | Stop reason: SDUPTHER

## 2024-04-04 RX ORDER — NEOSTIGMINE METHYLSULFATE 1 MG/ML
INJECTION, SOLUTION INTRAVENOUS PRN
Status: DISCONTINUED | OUTPATIENT
Start: 2024-04-04 | End: 2024-04-04 | Stop reason: SDUPTHER

## 2024-04-04 RX ORDER — SODIUM CHLORIDE, SODIUM LACTATE, POTASSIUM CHLORIDE, CALCIUM CHLORIDE 600; 310; 30; 20 MG/100ML; MG/100ML; MG/100ML; MG/100ML
INJECTION, SOLUTION INTRAVENOUS CONTINUOUS PRN
Status: DISCONTINUED | OUTPATIENT
Start: 2024-04-04 | End: 2024-04-04 | Stop reason: SDUPTHER

## 2024-04-04 RX ORDER — ONDANSETRON 2 MG/ML
4 INJECTION INTRAMUSCULAR; INTRAVENOUS
Status: CANCELLED | OUTPATIENT
Start: 2024-04-04 | End: 2024-04-05

## 2024-04-04 RX ORDER — INSULIN GLARGINE 100 [IU]/ML
0.4 INJECTION, SOLUTION SUBCUTANEOUS DAILY
Status: DISCONTINUED | OUTPATIENT
Start: 2024-04-04 | End: 2024-04-05

## 2024-04-04 RX ORDER — INSULIN LISPRO 100 [IU]/ML
10 INJECTION, SOLUTION INTRAVENOUS; SUBCUTANEOUS
Status: DISCONTINUED | OUTPATIENT
Start: 2024-04-04 | End: 2024-04-05 | Stop reason: HOSPADM

## 2024-04-04 RX ORDER — SODIUM CHLORIDE, SODIUM LACTATE, POTASSIUM CHLORIDE, CALCIUM CHLORIDE 600; 310; 30; 20 MG/100ML; MG/100ML; MG/100ML; MG/100ML
INJECTION, SOLUTION INTRAVENOUS CONTINUOUS
Status: CANCELLED | OUTPATIENT
Start: 2024-04-04

## 2024-04-04 RX ORDER — ONDANSETRON 2 MG/ML
INJECTION INTRAMUSCULAR; INTRAVENOUS PRN
Status: DISCONTINUED | OUTPATIENT
Start: 2024-04-04 | End: 2024-04-04 | Stop reason: SDUPTHER

## 2024-04-04 RX ORDER — INSULIN LISPRO 100 [IU]/ML
2 INJECTION, SOLUTION INTRAVENOUS; SUBCUTANEOUS ONCE
Status: COMPLETED | OUTPATIENT
Start: 2024-04-04 | End: 2024-04-04

## 2024-04-04 RX ORDER — FENTANYL CITRATE 50 UG/ML
100 INJECTION, SOLUTION INTRAMUSCULAR; INTRAVENOUS
Status: CANCELLED | OUTPATIENT
Start: 2024-04-04 | End: 2024-04-05

## 2024-04-04 RX ORDER — FENTANYL CITRATE 50 UG/ML
INJECTION, SOLUTION INTRAMUSCULAR; INTRAVENOUS PRN
Status: DISCONTINUED | OUTPATIENT
Start: 2024-04-04 | End: 2024-04-04 | Stop reason: SDUPTHER

## 2024-04-04 RX ORDER — GLYCOPYRROLATE 0.2 MG/ML
INJECTION INTRAMUSCULAR; INTRAVENOUS PRN
Status: DISCONTINUED | OUTPATIENT
Start: 2024-04-04 | End: 2024-04-04 | Stop reason: SDUPTHER

## 2024-04-04 RX ORDER — MIDAZOLAM HYDROCHLORIDE 2 MG/2ML
2 INJECTION, SOLUTION INTRAMUSCULAR; INTRAVENOUS
Status: CANCELLED | OUTPATIENT
Start: 2024-04-04 | End: 2024-04-05

## 2024-04-04 RX ORDER — DEXAMETHASONE SODIUM PHOSPHATE 4 MG/ML
INJECTION, SOLUTION INTRA-ARTICULAR; INTRALESIONAL; INTRAMUSCULAR; INTRAVENOUS; SOFT TISSUE PRN
Status: DISCONTINUED | OUTPATIENT
Start: 2024-04-04 | End: 2024-04-04 | Stop reason: SDUPTHER

## 2024-04-04 RX ADMIN — GLYCOPYRROLATE 0.4 MG: 0.2 INJECTION INTRAMUSCULAR; INTRAVENOUS at 11:20

## 2024-04-04 RX ADMIN — FENTANYL CITRATE 25 MCG: 50 INJECTION, SOLUTION INTRAMUSCULAR; INTRAVENOUS at 11:44

## 2024-04-04 RX ADMIN — VANCOMYCIN HYDROCHLORIDE 1000 MG: 1 INJECTION, POWDER, LYOPHILIZED, FOR SOLUTION INTRAVENOUS at 20:50

## 2024-04-04 RX ADMIN — PIPERACILLIN AND TAZOBACTAM 3375 MG: 3; .375 INJECTION, POWDER, LYOPHILIZED, FOR SOLUTION INTRAVENOUS at 17:32

## 2024-04-04 RX ADMIN — INSULIN LISPRO 4 UNITS: 100 INJECTION, SOLUTION INTRAVENOUS; SUBCUTANEOUS at 20:44

## 2024-04-04 RX ADMIN — PIPERACILLIN AND TAZOBACTAM 3375 MG: 3; .375 INJECTION, POWDER, LYOPHILIZED, FOR SOLUTION INTRAVENOUS at 02:21

## 2024-04-04 RX ADMIN — ONDANSETRON 4 MG: 2 INJECTION INTRAMUSCULAR; INTRAVENOUS at 11:05

## 2024-04-04 RX ADMIN — PROPOFOL 200 MG: 10 INJECTION, EMULSION INTRAVENOUS at 10:34

## 2024-04-04 RX ADMIN — FENTANYL CITRATE 100 MCG: 50 INJECTION, SOLUTION INTRAMUSCULAR; INTRAVENOUS at 10:33

## 2024-04-04 RX ADMIN — FENTANYL CITRATE 25 MCG: 50 INJECTION, SOLUTION INTRAMUSCULAR; INTRAVENOUS at 11:41

## 2024-04-04 RX ADMIN — INSULIN LISPRO 10 UNITS: 100 INJECTION, SOLUTION INTRAVENOUS; SUBCUTANEOUS at 13:25

## 2024-04-04 RX ADMIN — OXYCODONE 5 MG: 5 TABLET ORAL at 15:52

## 2024-04-04 RX ADMIN — FENTANYL CITRATE 50 MCG: 50 INJECTION, SOLUTION INTRAMUSCULAR; INTRAVENOUS at 11:16

## 2024-04-04 RX ADMIN — SODIUM CHLORIDE, POTASSIUM CHLORIDE, SODIUM LACTATE AND CALCIUM CHLORIDE: 600; 310; 30; 20 INJECTION, SOLUTION INTRAVENOUS at 09:34

## 2024-04-04 RX ADMIN — VANCOMYCIN HYDROCHLORIDE 1250 MG: 1.25 INJECTION, POWDER, LYOPHILIZED, FOR SOLUTION INTRAVENOUS at 06:42

## 2024-04-04 RX ADMIN — ROCURONIUM BROMIDE 40 MG: 10 INJECTION INTRAVENOUS at 10:34

## 2024-04-04 RX ADMIN — DEXAMETHASONE SODIUM PHOSPHATE 8 MG: 4 INJECTION, SOLUTION INTRAMUSCULAR; INTRAVENOUS at 10:28

## 2024-04-04 RX ADMIN — INSULIN LISPRO 15 UNITS: 100 INJECTION, SOLUTION INTRAVENOUS; SUBCUTANEOUS at 17:24

## 2024-04-04 RX ADMIN — INSULIN LISPRO 8 UNITS: 100 INJECTION, SOLUTION INTRAVENOUS; SUBCUTANEOUS at 17:24

## 2024-04-04 RX ADMIN — Medication 3 MG: at 11:20

## 2024-04-04 RX ADMIN — OXYCODONE 5 MG: 5 TABLET ORAL at 04:49

## 2024-04-04 RX ADMIN — FENTANYL CITRATE 50 MCG: 50 INJECTION, SOLUTION INTRAMUSCULAR; INTRAVENOUS at 10:28

## 2024-04-04 RX ADMIN — INSULIN LISPRO 2 UNITS: 100 INJECTION, SOLUTION INTRAVENOUS; SUBCUTANEOUS at 20:44

## 2024-04-04 RX ADMIN — MIDAZOLAM HYDROCHLORIDE 2 MG: 1 INJECTION, SOLUTION INTRAMUSCULAR; INTRAVENOUS at 10:28

## 2024-04-04 RX ADMIN — INSULIN LISPRO 10 UNITS: 100 INJECTION, SOLUTION INTRAVENOUS; SUBCUTANEOUS at 17:24

## 2024-04-04 RX ADMIN — INSULIN GLARGINE 40 UNITS: 100 INJECTION, SOLUTION SUBCUTANEOUS at 13:24

## 2024-04-04 RX ADMIN — PIPERACILLIN AND TAZOBACTAM 3375 MG: 3; .375 INJECTION, POWDER, LYOPHILIZED, FOR SOLUTION INTRAVENOUS at 10:53

## 2024-04-04 ASSESSMENT — PAIN DESCRIPTION - ORIENTATION: ORIENTATION: ANTERIOR;POSTERIOR

## 2024-04-04 ASSESSMENT — PAIN SCALES - GENERAL
PAINLEVEL_OUTOF10: 2
PAINLEVEL_OUTOF10: 5
PAINLEVEL_OUTOF10: 0
PAINLEVEL_OUTOF10: 7
PAINLEVEL_OUTOF10: 0

## 2024-04-04 ASSESSMENT — PAIN DESCRIPTION - LOCATION
LOCATION: BUTTOCKS
LOCATION: HEAD

## 2024-04-04 ASSESSMENT — PAIN DESCRIPTION - DESCRIPTORS
DESCRIPTORS: ACHING
DESCRIPTORS: SORE
DESCRIPTORS: BURNING

## 2024-04-04 ASSESSMENT — LIFESTYLE VARIABLES: SMOKING_STATUS: 1

## 2024-04-04 ASSESSMENT — PAIN - FUNCTIONAL ASSESSMENT
PAIN_FUNCTIONAL_ASSESSMENT: 0-10
PAIN_FUNCTIONAL_ASSESSMENT: PREVENTS OR INTERFERES SOME ACTIVE ACTIVITIES AND ADLS

## 2024-04-04 NOTE — PLAN OF CARE
Problem: Pain  Goal: Verbalizes/displays adequate comfort level or baseline comfort level  Outcome: Progressing  Flowsheets (Taken 4/4/2024 1422)  Verbalizes/displays adequate comfort level or baseline comfort level:   Administer analgesics based on type and severity of pain and evaluate response   Assess pain using appropriate pain scale   Encourage patient to monitor pain and request assistance

## 2024-04-04 NOTE — CONSULTS
[facility]  DIABETES MANAGEMENT CONSULT    Consulted by Provider for advanced nursing evaluation and care for inpatient blood glucose management.    Evaluation and Action Plan   Sonal Cottrell is a 53 y.o. female with history of ITP, but no prior history of diabetes, who was admitted for worsening left gluteal ulcer. She was seen in the ED on 4/1 for same issue, wound was drained and packed, and she was sent home on antibiotics. However, she returned due to foul-smelling purulent drainage. I&D done by surgery on 4/4 and patient remains on antibiotics. Of note, her BG with first ED visit was 442 and then 401 when she came back in. She states both her parents have diabetes, but she has never been given a diagnosis. She had a medical visit last year where she was told her BG was high, but was not given treatment. She did try to change her diet at that point, but admits to falling back into old habits. Admits to being chronically fatigued. We discussed the significance of her A1c (12.7%) versus what is normal. Reviewed what diabetes is, normal blood sugars, and risks associated with uncontrolled diabetes (such as infection). Also reviewed healthy plate and CHO control, how often to check BG, insulin pens use and care, s/s hypoglycemia and how to treat, importance of exercise, and importance of follow up with PCP and outpatient PDH classes. Patient receptive to learning, but is still trying to process all of it. Mother at bedside and supportive. Both offered chance to ask questions/voice concerns. Will continue to review home diabetes management tomorrow.     BG today 289, 280 even with being NPO for surgery. She was given dexamethasone in OR, which may further drive BG up. Starting patient on basal/bolus insulin regimen today. Goal -180 for adequate healing from surgical wound.     Management Rationale Action Plan   Medication   Basal needs Using 0.4 units/kg/D based on weight,

## 2024-04-04 NOTE — BRIEF OP NOTE
Brief Postoperative Note      Patient: Sonal Cottrell  YOB: 1970  MRN: [id number]    Date of Procedure: 4/4/2024    Pre-Op Diagnosis Codes:     * Wound of gluteal cleft, left, initial encounter [S31.829A]    Post-Op Diagnosis: Same       Procedure(s):  LEFT GLUTEAL WOUND SHARP DEBRIDEMENT SKIN SUBCUTANEOUS TISSUE DRAINAIGE OF ABSCESS COMPLICATED    Surgeon(s):  Sanjay Gabriel MD    Assistant:  Surgical Assistant: Katie Castillo    Anesthesia: General    Estimated Blood Loss (mL): Minimal    Complications: None    Specimens:   ID Type Source Tests Collected by Time Destination   1 : LEFT GLUTEAL SUBCUTANEOUS TISSUE Tissue Buttock SURGICAL PATHOLOGY Sanjay Gabriel MD 4/4/2024 1129    A : LEFT GLUTEUS ABSCESS Swab Buttock CULTURE, ANAEROBIC, CULTURE, FUNGUS, CULTURE, WOUND Sanjay Gabriel MD 4/4/2024 1113        Implants:  * No implants in log *      Drains:   Open Drain 04/04/24 Left Buttock (Active)   Dressing Status New dressing applied 04/04/24 1119       Findings:  Infection Present At Time Of Surgery (PATOS) (choose all levels that have infection present):  - Superficial Infection (skin/subcutaneous) present as evidenced by pus  Other Findings: Tracking along adductor fascia left adductor fascia.  Normal rectal tone, no evidence of involvement.    Electronically signed by Sanjay Gabriel MD on 4/4/2024 at 11:37 AM    [id number]

## 2024-04-04 NOTE — ANESTHESIA PRE PROCEDURE
Sky DURBIN MD       • glucagon injection 1 mg  1 mg SubCUTAneous PRN Sky Montiel MD       • dextrose 10 % infusion   IntraVENous Continuous PRN Sky Montiel MD       • vancomycin (VANCOCIN) 1,250 mg in sodium chloride 0.9 % 250 mL IVPB ([id number])  1,250 mg IntraVENous Q18H Sky Montiel MD   Stopped at 04/04/24 0810       Allergies:    Allergies   Allergen Reactions   • Sulfa Antibiotics Hives       Problem List:    Patient Active Problem List   Diagnosis Code   • Hyperglycemia R73.9   • Diabetes mellitus (HCC) E11.9   • Hepatic steatosis K76.0   • History of ITP Z86.2   • Morbid obesity (HCC) E66.01   • Hypoalbuminemia E88.09   • Gluteal cleft wound S31.800L       Past Medical History:        Diagnosis Date   • Diabetes mellitus (HCC)    • Hepatic steatosis    • History of ITP    • Hypoalbuminemia    • Morbid obesity (HCC)        Past Surgical History:        Procedure Laterality Date   • CARPAL TUNNEL RELEASE Bilateral 2005       Social History:    Social History     Tobacco Use   • Smoking status: Not on file   • Smokeless tobacco: Not on file   Substance Use Topics   • Alcohol use: Not Currently     Comment: Occasionally                                Counseling given: Not Answered      Vital Signs (Current):   Vitals:    04/04/24 0037 04/04/24 0537 04/04/24 0730 04/04/24 0838   BP: 114/63 128/67 121/73 (!) 141/66   Pulse: 65 71 66 76   Resp: 16 15 16 14   Temp: 97.7 °F (36.5 °C) 98.1 °F (36.7 °C) 98.1 °F (36.7 °C) 97.8 °F (36.6 °C)   TempSrc: Oral Oral Oral Oral   SpO2: 98% 97%  97%   Height:                                                  BP Readings from Last 3 Encounters:   04/04/24 (!) 141/66   04/01/24 114/77       NPO Status: Time of last liquid consumption: 2330                        Time of last solid consumption: 1830                        Date of last liquid consumption: 04/03/24                        Date of last solid food consumption: 04/03/24    BMI:   Wt Readings from Last 3

## 2024-04-04 NOTE — PERIOP NOTE
TRANSFER - OUT REPORT:    Verbal report given to TAMIR PORTILLO on Sonal Cottrell  being transferred to [facility] for routine post-op       Report consisted of patient's Situation, Background, Assessment and   Recommendations(SBAR).     Information from the following report(s) Nurse Handoff Report, Surgery Report, Intake/Output, MAR, and Cardiac Rhythm NSR  was reviewed with the receiving nurse.           Lines:   Peripheral IV 04/03/24 Right Antecubital (Active)   Site Assessment Clean, dry & intact 04/04/24 0902   Line Status Heparin locked 04/04/24 0902   Line Care Cap changed;Connections checked and tightened 04/03/24 2149   Phlebitis Assessment No symptoms 04/04/24 0902   Infiltration Assessment 0 04/03/24 2149   Alcohol Cap Used Yes 04/03/24 2149   Dressing Status Clean, dry & intact 04/03/24 2149   Dressing Type Transparent 04/03/24 2149       Peripheral IV 04/03/24 Posterior;Right Hand (Active)   Site Assessment Clean, dry & intact 04/04/24 0902   Line Status Infusing 04/04/24 0902   Line Care Connections checked and tightened 04/04/24 0902   Phlebitis Assessment No symptoms 04/04/24 0902   Infiltration Assessment 0 04/03/24 2149   Alcohol Cap Used Yes 04/03/24 2149   Dressing Status Clean, dry & intact 04/03/24 2149   Dressing Type Transparent 04/03/24 2149        Opportunity for questions and clarification was provided.      Patient transported with:  Registered Nurse

## 2024-04-04 NOTE — PROGRESS NOTES
Verbal shift change report given to Harmony (oncoming nurse) by Betty (offgoing nurse). Report included the following information Nurse Handoff Report, ED Encounter Summary, ED SBAR, Recent Results, and Med Rec Status.       TRANSFER - OUT REPORT:    Verbal report given to Harmony on Sonal Cottrell  being transferred to [facility] for ordered procedure       Report consisted of patient's Situation, Background, Assessment and   Recommendations(SBAR).     Information from the following report(s) Nurse Handoff Report, ED Encounter Summary, ED SBAR, MAR, and Recent Results was reviewed with the receiving nurse.    [place] Fall Assessment:    Presents to emergency department  because of falls (Syncope, seizure, or loss of consciousness): No  Age > 70: No  Altered Mental Status, Intoxication with alcohol or substance confusion (Disorientation, impaired judgment, poor safety awaremess, or inability to follow instructions): No  Impaired Mobility: Ambulates or transfers with assistive devices or assistance; Unable to ambulate or transer.: No  Nursing Judgement: Yes          Lines:   Peripheral IV 04/03/24 Right Antecubital (Active)   Site Assessment Clean, dry & intact 04/03/24 2040   Line Status Capped 04/03/24 2040   Line Care Cap changed 04/03/24 2040   Phlebitis Assessment No symptoms 04/03/24 2040   Infiltration Assessment 0 04/03/24 2040   Alcohol Cap Used Yes 04/03/24 2040   Dressing Status Clean, dry & intact 04/03/24 2040   Dressing Type Transparent 04/03/24 2040       Peripheral IV 04/03/24 Posterior;Right Hand (Active)        Opportunity for questions and clarification was provided.      Patient transported with:  Tech

## 2024-04-04 NOTE — OP NOTE
[facility]          [address] [place]  [address]                            OPERATIVE REPORT      PATIENT NAME: TATYANA HAQUE                : 1970  MED REC NO: [id number]                       ROOM: OR  ACCOUNT NO: [id number]                       ADMIT DATE: 2024  PROVIDER: Sanjay Gabriel MD    DATE OF SERVICE:  2024    PREOPERATIVE DIAGNOSES:  Wound of gluteal cleft initially at time of *** initial encounter.    POSTOPERATIVE DIAGNOSES:  Wound of gluteal cleft initially at time of *** initial encounter.    PROCEDURES PERFORMED:  Left gluteal wound sharp debridement of skin, subcutaneous tissue with drainage of abscess, complicated ***.    SURGEON:  Sanjay Gabriel MD    ASSISTANT:  ***.    ANESTHESIA:  ***    ESTIMATED BLOOD LOSS:  Minimal.    SPECIMENS REMOVED:  Left gluteal subcutaneous tissue for permanent and left gluteus abscess for *** microbiology.    INTRAOPERATIVE FINDINGS:  Superficial infection of skin and subcutaneous tissue present as evidenced by pus.  Tracking along the left adductor fascia.  Normal rectal tone.  No evidence of rectal involvement.     COMPLICATIONS:  None.    IMPLANTS:  ***    INDICATIONS:  The patient is a pleasant 53-year-old female, who presented on  with abscess that was drained by during the emergency department.  She was represented yesterday with worsening abscess and changed of the skin.  CT scan was consistent with a septated abscess with extensive subcutaneous emphysema.  She was taken to the operative room for surgical management.    DESCRIPTION OF PROCEDURE:  Consent was obtained.  The patient was taken to the operating room, placed in the supine position.  SCDs were turned on and working.  Gonzalez catheter was not placed.  *** turned prone.  The perineum was prepped in the usual sterile fashion.  A time-out was performed per protocol.  An elliptical 10 cm x 5 cm skin incision was made  abscess where more pus was evacuated.  The pus and the abscess cavity tracked along the left adductor fascia.  The rectum was not involved.  The pulse lavage was then used to evacuate the subcutaneous tissues to get rid of the remaining of dead tissue.  Wound was cleaned and bleeding at the time of debridement.  A 1-inch Penrose was placed down the tracking aspect of the other wound down along the adductor.  The skin was reapproximated with 2-0 Vicryl sutures.  An island of skin and subcutaneous tissue measuring approximately 15 cm in circular diameter was passed off to Pathology for analysis.  Cultures were taken.  Once the defect was closed, approximately 3 cm, the wound was packed with  iodoform gauze.  Dressings were placed.  The patient tolerated procedure well.    PRIMARY CARE PROVIDER:  None.    HOSPITALIST OF RECORD:  Roe Nicolas MD.    DRAINS:  Penrose drain placed along the tracking aspect of the wound.        MD JOVANNY WHITAKER/AQS  D:  04/04/2024 11:44:14  T:  04/04/2024 16:04:44  JOB #:  [id number]

## 2024-04-04 NOTE — PROGRESS NOTES
[facility]    Hospitalist Progress Note    NAME: Sonal Cottrell   :  1970  MRM:  [id number]    Date/Time of service 2024  6:55 AM    To assist coordination of care and communication with nursing and staff, this note may be preliminary early in the day, but finalized by end of the day.        Assessment and Plan:     Gluteal cleft wound - POA. No SIRS criteria, not septic, no abscess on CT, has not failed outpatient Abx. GPC on wound Cx.  Blood Cx NGTD. For now empirically on Vacno and Zosyn. Prn oxycodone and IV morphine if needed for pain. General surgery to do debridement and wound vac    Diabetes mellitus type 2 - Diabetic diet when eating.  SSI per protocol.  Continue home NPH. Check A1c.    Morbid obesity / Hypoalbuminemia - Advise weight loss and testing for sleep apnea as outpatient    Hepatic steatosis - Noted on CT.  Check hepatitis panel      History of ITP - CBC is stable       Subjective:     Chief Complaint:  wound pain    ROS:  (bold if positive, if negative)    Tolerating PT   NPO        Objective:     Last 24hrs VS reviewed since prior progress note. Most recent are:    Vitals:    24 2145 24 0037 24 0537   BP: 113/63 (!) 140/72 114/63 128/67   Pulse:  73 65 71   Resp:  16 16 15   Temp:  97.9 °F (36.6 °C) 97.7 °F (36.5 °C) 98.1 °F (36.7 °C)   TempSrc:  Oral Oral Oral   SpO2: 94% 96% 98% 97%   Height:          @[id number]@     No intake or output data in the 24 hours ending 24 0655     Physical Exam:    Gen:  Morbid obese, in no acute distress  HEENT:  Pink conjunctivae, PERRL, hearing intact to voice, moist mucous membranes  Neck:  Supple, without masses, thyroid non-tender  Resp:  No accessory muscle use, clear breath sounds without wheezes rales or rhonchi  Card:  No murmurs, normal S1, S2 without thrills, bruits or peripheral edema  Abd:  Soft, non-tender, non-distended, normoactive bowel sounds are present, no

## 2024-04-04 NOTE — CARE COORDINATION
Initial Case Management Assessment           04/04/24 1300   Service Assessment   History Provided By Significant Other  (pt in OR at the time of assessment)   Primary Caregiver Self   Support Systems Spouse/Significant Other   Patient's Healthcare Decision Maker is: Legal Next of Kin  ( Steven Caraballo [phone])   PCP Verified by CM   (does not have, PCP list has been provided)   Prior Functional Level Independent in ADLs/IADLs   Can patient return to prior living arrangement Yes   Ability to make needs known: Good   Family able to assist with home care needs: Yes   Would you like for me to discuss the discharge plan with any other family members/significant others, and if so, who? Yes  ( Steven Caraballo [phone])   Financial Resources Other (Comment)  (BCBS)   Community Resources None   Social/Functional History   Lives With Spouse   Type of Home Apartment   Home Layout One level   Home Access Stairs to enter with rails   Entrance Stairs - Number of Steps 17 steps to enter 2nd floor apartment   Bathroom Equipment None   Home Equipment None   ADL Assistance Independent   Homemaking Assistance Independent   Ambulation Assistance Independent   Transfer Assistance Independent   Active  Yes  (up until 2 weeks ago)   Occupation Full time employment;Works at home   Discharge Planning   Type of Residence Apartment   Living Arrangements Spouse/Significant Other   Current Services Prior To Admission None   Potential Assistance Needed Home Care   DME Ordered? No   Potential Assistance Purchasing Medications No   Type of Home Care Services Nursing Services   Patient expects to be discharged to: Apartment   One/Two Story Residence One story   Services At/After Discharge   Transition of Care Consult (CM Consult) Home Health    Resource Information Provided? No   Mode of Transport at Discharge Other (see comment)  ()         Patient was admitted on 4/3/24 for gluteal cleft wound. This CM met  with patient's  and patient's mother at bedside, introduced self, explained role, and confirmed demographic information on face sheet. Patient was in the OR at the time of assessment.       Patient lives in an apartment on the second story with . There are 17 steps to enter the apartment but once in apartment it is 1 story. Patient and  just moved to [place], address to apartment is [address] [place] [address]. Pt is independent with all ADL's and driving up until 2 weeks ago prior to gluteal wound. Pt does not have DME and has no Hx of HH/IPR/SNF.       Pharmacy: [facility]  AD:  Steven Caraballo [phone] , does not have paperwork  PCP: does not have. PCP list given.  states Dr. Gabriel will sign HH orders and follow wound care.  Emergency Contact:  Steven Caraballo [phone]   DC ride:  Steven         Patient's  plans for patient to dc home once medically stable. CM referral for HH has been received. Patient's  does not have a preference, as long as HH company is in network with insurance.  agreeable to mass referral send out and will give choice once accepting companies are received. CM has sent referral in [employer]. CM will continue to follow.         ___________________________________________   Luci Lugo RN Case Manager  4/4/2024   1:13 PM

## 2024-04-04 NOTE — WOUND CARE
Wound consult for buttock wound:  Patient seen by surgery and in OR at this time  Will follow post-op if needed  Cecile Charles RN   wound

## 2024-04-04 NOTE — CONSULTS
[facility]          [address] [place]  [address]                              CONSULTATION      PATIENT NAME: TATYANA HAQUE                : 1970  MED REC NO: [id number]                       ROOM: ER15  ACCOUNT NO: [id number]                       ADMIT DATE: 2024  PROVIDER: Sanjay Gabriel MD    DATE OF SERVICE:  2024    ATTENDING PHYSICIAN:  Sky Montiel MD    PRIMARY CARE PROVIDER:  None.    HOSPITALIST OF RECORD:  ***    REASON FOR CONSULTATION:  Evaluation for worsening gluteal fold abscess.    HISTORY OF PRESENT ILLNESS:  The patient is a 53-year-old female, diabetic, obese, complaining of the buttock wound.  She occasionally has allergy problem due to buttock abscess *** drained and packed and sent home.  Cultures were not sent.  She presents today with worsening of the gluteal abscess.  There is severe necrosis.  There is a question of necrotizing fasciitis.    REVIEW OF SYSTEMS:  Positive for above complaint.  Negative for general, HEENT, cardiovascular, genitourinary, musculoskeletal, neurologic, psychiatric, endocrine, hematology.    PAST MEDICAL HISTORY:  See HPI.    PAST SURGICAL HISTORY:  None.    SOCIAL HISTORY:  Nonsmoker and nondrinker.    FAMILY HISTORY:  Unrelated to current evaluation.    HOME MEDICATIONS:  Augmentin, Naprosyn, ***.    ALLERGIES:  SULFA.      PHYSICAL EXAMINATION:  VITAL SIGNS:  Reviewed.   GENERAL:  Well-developed female, no apparent distress.  HEENT:  Normocephalic, atraumatic.  NECK:  Supple.  Trachea in the midline.  CHEST:  Clear.  HEART:  Regular.   ABDOMEN:  Soft, nondistended.  BACK:  The patient has a large buttock ulcer on the left side measuring approximately 5 cm in circular diameter.  There is no evidence of crepitus.  RECTAL:  Unremarkable.  MUSCULOSKELETAL:  No clubbing, cyanosis, or edema.  SKIN:  No evidence of rash or lesion.  PSYCHIATRIC:  Appropriate and pleasant.  NEUROLOGIC:   Grossly nonfocal.    RADIOLOGY:  Reviewed.    CT scan suggests *** in the left gluteal fold.    LABORATORY DATA:  Reviewed.  White blood cell count is 8.4.    ASSESSMENT:  Large gluteal ulcer with evidence of devitalized subcutaneous tissue.    PLAN:  Risks and benefits of surgery were discussed with the patient including but are not limited to risk of bleeding, infection, risk of nonhealing wound.  Plan is to proceed with debridement of gluteal ulcer in the operating room with placement of a wound VAC.  All questions were to the patient's satisfaction.      Total time of consultation including review of history and discussion of plan was approximately 30 minutes.        MD JOVANNY WHITAKER/AQS  D:  04/03/2024 18:13:22  T:  04/03/2024 20:49:14  JOB #:  [id number]    CC:   Sky Montiel MD

## 2024-04-05 ENCOUNTER — HOME HEALTH ADMISSION (OUTPATIENT)
Dept: HOME HEALTH SERVICES | Facility: HOME HEALTH | Age: 54
End: 2024-04-05
Payer: COMMERCIAL

## 2024-04-05 VITALS
HEIGHT: 62 IN | BODY MASS INDEX: 40.24 KG/M2 | DIASTOLIC BLOOD PRESSURE: 80 MMHG | RESPIRATION RATE: 16 BRPM | HEART RATE: 72 BPM | OXYGEN SATURATION: 92 % | TEMPERATURE: 97.3 F | SYSTOLIC BLOOD PRESSURE: 146 MMHG

## 2024-04-05 LAB
ALBUMIN SERPL-MCNC: 2.5 G/DL (ref 3.5–5)
ALBUMIN/GLOB SERPL: 0.7 (ref 1.1–2.2)
ALP SERPL-CCNC: 99 U/L (ref 45–117)
ALT SERPL-CCNC: 58 U/L (ref 12–78)
AMPHET UR QL SCN: NEGATIVE
ANION GAP SERPL CALC-SCNC: 5 MMOL/L (ref 5–15)
APPEARANCE UR: CLEAR
AST SERPL-CCNC: 34 U/L (ref 15–37)
BACTERIA SPEC CULT: NORMAL
BARBITURATES UR QL SCN: NEGATIVE
BENZODIAZ UR QL: POSITIVE
BILIRUB SERPL-MCNC: 0.8 MG/DL (ref 0.2–1)
BILIRUB UR QL: NEGATIVE
BUN SERPL-MCNC: 13 MG/DL (ref 6–20)
BUN/CREAT SERPL: 19 (ref 12–20)
CALCIUM SERPL-MCNC: 8.9 MG/DL (ref 8.5–10.1)
CANNABINOIDS UR QL SCN: NEGATIVE
CHLORIDE SERPL-SCNC: 102 MMOL/L (ref 97–108)
CO2 SERPL-SCNC: 26 MMOL/L (ref 21–32)
COCAINE UR QL SCN: NEGATIVE
COLOR UR: ABNORMAL
CREAT SERPL-MCNC: 0.7 MG/DL (ref 0.55–1.02)
ERYTHROCYTE [DISTWIDTH] IN BLOOD BY AUTOMATED COUNT: 12.4 % (ref 11.5–14.5)
GLOBULIN SER CALC-MCNC: 3.7 G/DL (ref 2–4)
GLUCOSE BLD STRIP.AUTO-MCNC: 262 MG/DL (ref 65–117)
GLUCOSE BLD STRIP.AUTO-MCNC: 319 MG/DL (ref 65–117)
GLUCOSE SERPL-MCNC: 327 MG/DL (ref 65–100)
GLUCOSE UR STRIP.AUTO-MCNC: >1000 MG/DL
GRAM STN SPEC: NORMAL
GRAM STN SPEC: NORMAL
HCT VFR BLD AUTO: 40 % (ref 35–47)
HGB BLD-MCNC: 13.9 G/DL (ref 11.5–16)
HGB UR QL STRIP: NEGATIVE
KETONES UR QL STRIP.AUTO: NEGATIVE MG/DL
LEUKOCYTE ESTERASE UR QL STRIP.AUTO: NEGATIVE
Lab: ABNORMAL
MAGNESIUM SERPL-MCNC: 2.1 MG/DL (ref 1.6–2.4)
MCH RBC QN AUTO: 30 PG (ref 26–34)
MCHC RBC AUTO-ENTMCNC: 34.8 G/DL (ref 30–36.5)
MCV RBC AUTO: 86.2 FL (ref 80–99)
METHADONE UR QL: NEGATIVE
NITRITE UR QL STRIP.AUTO: NEGATIVE
NRBC # BLD: 0 K/UL (ref 0–0.01)
NRBC BLD-RTO: 0 PER 100 WBC
OPIATES UR QL: NEGATIVE
PCP UR QL: NEGATIVE
PH UR STRIP: 6.5 (ref 5–8)
PHOSPHATE SERPL-MCNC: 2.2 MG/DL (ref 2.6–4.7)
PLATELET # BLD AUTO: 324 K/UL (ref 150–400)
PMV BLD AUTO: 10.3 FL (ref 8.9–12.9)
POTASSIUM SERPL-SCNC: 4.2 MMOL/L (ref 3.5–5.1)
PROT SERPL-MCNC: 6.2 G/DL (ref 6.4–8.2)
PROT UR STRIP-MCNC: NEGATIVE MG/DL
RBC # BLD AUTO: 4.64 M/UL (ref 3.8–5.2)
SERVICE CMNT-IMP: ABNORMAL
SERVICE CMNT-IMP: ABNORMAL
SERVICE CMNT-IMP: NORMAL
SODIUM SERPL-SCNC: 133 MMOL/L (ref 136–145)
SP GR UR REFRACTOMETRY: 1.02 (ref 1–1.03)
UROBILINOGEN UR QL STRIP.AUTO: 1 EU/DL (ref 0.2–1)
WBC # BLD AUTO: 18 K/UL (ref 3.6–11)

## 2024-04-05 PROCEDURE — 2580000003 HC RX 258: Performed by: INTERNAL MEDICINE

## 2024-04-05 PROCEDURE — 85027 COMPLETE CBC AUTOMATED: CPT

## 2024-04-05 PROCEDURE — 87086 URINE CULTURE/COLONY COUNT: CPT

## 2024-04-05 PROCEDURE — 82962 GLUCOSE BLOOD TEST: CPT

## 2024-04-05 PROCEDURE — 36415 COLL VENOUS BLD VENIPUNCTURE: CPT

## 2024-04-05 PROCEDURE — 83735 ASSAY OF MAGNESIUM: CPT

## 2024-04-05 PROCEDURE — 6370000000 HC RX 637 (ALT 250 FOR IP): Performed by: INTERNAL MEDICINE

## 2024-04-05 PROCEDURE — 80307 DRUG TEST PRSMV CHEM ANLYZR: CPT

## 2024-04-05 PROCEDURE — 6360000002 HC RX W HCPCS: Performed by: INTERNAL MEDICINE

## 2024-04-05 PROCEDURE — 94761 N-INVAS EAR/PLS OXIMETRY MLT: CPT

## 2024-04-05 PROCEDURE — 81002 URINALYSIS NONAUTO W/O SCOPE: CPT

## 2024-04-05 PROCEDURE — 84100 ASSAY OF PHOSPHORUS: CPT

## 2024-04-05 PROCEDURE — 99231 SBSQ HOSP IP/OBS SF/LOW 25: CPT

## 2024-04-05 PROCEDURE — 6370000000 HC RX 637 (ALT 250 FOR IP)

## 2024-04-05 PROCEDURE — 80053 COMPREHEN METABOLIC PANEL: CPT

## 2024-04-05 RX ORDER — INSULIN GLARGINE 100 [IU]/ML
50 INJECTION, SOLUTION SUBCUTANEOUS NIGHTLY
Qty: 5 ADJUSTABLE DOSE PRE-FILLED PEN SYRINGE | Refills: 0 | Status: SHIPPED | OUTPATIENT
Start: 2024-04-05 | End: 2024-05-05

## 2024-04-05 RX ORDER — BLOOD-GLUCOSE METER
1 KIT MISCELLANEOUS DAILY
Qty: 1 KIT | Refills: 0 | Status: SHIPPED | OUTPATIENT
Start: 2024-04-05 | End: 2024-04-06

## 2024-04-05 RX ORDER — AMOXICILLIN AND CLAVULANATE POTASSIUM 875; 125 MG/1; MG/1
1 TABLET, FILM COATED ORAL EVERY 12 HOURS SCHEDULED
Status: DISCONTINUED | OUTPATIENT
Start: 2024-04-05 | End: 2024-04-05 | Stop reason: HOSPADM

## 2024-04-05 RX ORDER — INSULIN GLARGINE 100 [IU]/ML
50 INJECTION, SOLUTION SUBCUTANEOUS DAILY
Status: DISCONTINUED | OUTPATIENT
Start: 2024-04-05 | End: 2024-04-05 | Stop reason: HOSPADM

## 2024-04-05 RX ORDER — AMOXICILLIN AND CLAVULANATE POTASSIUM 875; 125 MG/1; MG/1
1 TABLET, FILM COATED ORAL EVERY 12 HOURS SCHEDULED
Qty: 14 TABLET | Refills: 0 | Status: SHIPPED | OUTPATIENT
Start: 2024-04-05 | End: 2024-04-12

## 2024-04-05 RX ORDER — DOXYCYCLINE HYCLATE 100 MG
100 TABLET ORAL EVERY 12 HOURS SCHEDULED
Qty: 14 TABLET | Refills: 0 | Status: SHIPPED | OUTPATIENT
Start: 2024-04-06 | End: 2024-04-13

## 2024-04-05 RX ORDER — DOXYCYCLINE HYCLATE 100 MG
100 TABLET ORAL EVERY 12 HOURS SCHEDULED
Status: DISCONTINUED | OUTPATIENT
Start: 2024-04-06 | End: 2024-04-05 | Stop reason: HOSPADM

## 2024-04-05 RX ORDER — INSULIN HUMAN 500 [IU]/ML
10 INJECTION, SOLUTION SUBCUTANEOUS
Qty: 2 EACH | Refills: 0 | Status: SHIPPED | OUTPATIENT
Start: 2024-04-05 | End: 2024-07-14

## 2024-04-05 RX ADMIN — PIPERACILLIN AND TAZOBACTAM 3375 MG: 3; .375 INJECTION, POWDER, LYOPHILIZED, FOR SOLUTION INTRAVENOUS at 01:45

## 2024-04-05 RX ADMIN — INSULIN GLARGINE 50 UNITS: 100 INJECTION, SOLUTION SUBCUTANEOUS at 09:02

## 2024-04-05 RX ADMIN — INSULIN LISPRO 6 UNITS: 100 INJECTION, SOLUTION INTRAVENOUS; SUBCUTANEOUS at 12:23

## 2024-04-05 RX ADMIN — OXYCODONE 5 MG: 5 TABLET ORAL at 15:09

## 2024-04-05 RX ADMIN — PIPERACILLIN AND TAZOBACTAM 3375 MG: 3; .375 INJECTION, POWDER, LYOPHILIZED, FOR SOLUTION INTRAVENOUS at 10:20

## 2024-04-05 RX ADMIN — INSULIN LISPRO 10 UNITS: 100 INJECTION, SOLUTION INTRAVENOUS; SUBCUTANEOUS at 12:22

## 2024-04-05 RX ADMIN — OXYCODONE 5 MG: 5 TABLET ORAL at 04:40

## 2024-04-05 RX ADMIN — AMOXICILLIN AND CLAVULANATE POTASSIUM 1 TABLET: 875; 125 TABLET, FILM COATED ORAL at 10:22

## 2024-04-05 RX ADMIN — INSULIN LISPRO 4 UNITS: 100 INJECTION, SOLUTION INTRAVENOUS; SUBCUTANEOUS at 09:01

## 2024-04-05 RX ADMIN — VANCOMYCIN HYDROCHLORIDE 1000 MG: 1 INJECTION, POWDER, LYOPHILIZED, FOR SOLUTION INTRAVENOUS at 12:29

## 2024-04-05 RX ADMIN — OXYCODONE 5 MG: 5 TABLET ORAL at 08:58

## 2024-04-05 RX ADMIN — VANCOMYCIN HYDROCHLORIDE 1000 MG: 1 INJECTION, POWDER, LYOPHILIZED, FOR SOLUTION INTRAVENOUS at 04:45

## 2024-04-05 RX ADMIN — INSULIN LISPRO 10 UNITS: 100 INJECTION, SOLUTION INTRAVENOUS; SUBCUTANEOUS at 09:01

## 2024-04-05 ASSESSMENT — PAIN SCALES - GENERAL
PAINLEVEL_OUTOF10: 4
PAINLEVEL_OUTOF10: 5
PAINLEVEL_OUTOF10: 3
PAINLEVEL_OUTOF10: 5

## 2024-04-05 ASSESSMENT — PAIN DESCRIPTION - DESCRIPTORS
DESCRIPTORS: ACHING

## 2024-04-05 ASSESSMENT — PAIN DESCRIPTION - ORIENTATION
ORIENTATION: LEFT
ORIENTATION: POSTERIOR
ORIENTATION: POSTERIOR

## 2024-04-05 ASSESSMENT — PAIN DESCRIPTION - LOCATION
LOCATION: BUTTOCKS

## 2024-04-05 NOTE — PLAN OF CARE
Problem: Safety - Adult  Goal: Free from fall injury  Outcome: Progressing     Problem: Discharge Planning  Goal: Discharge to home or other facility with appropriate resources  Outcome: Progressing     Problem: Chronic Conditions and Co-morbidities  Goal: Patient's chronic conditions and co-morbidity symptoms are monitored and maintained or improved  Outcome: Progressing     Problem: Pain  Goal: Verbalizes/displays adequate comfort level or baseline comfort level  4/5/2024 0127 by Dontae Hughes LPN  Outcome: Progressing  4/4/2024 1422 by Tashia Jin RN  Outcome: Progressing  Flowsheets (Taken 4/4/2024 1422)  Verbalizes/displays adequate comfort level or baseline comfort level:   Administer analgesics based on type and severity of pain and evaluate response   Assess pain using appropriate pain scale   Encourage patient to monitor pain and request assistance

## 2024-04-05 NOTE — PROGRESS NOTES
[facility]    Hospitalist Progress Note    NAME: Sonal Cottrell   :  1970  MRM:  [id number]    Date/Time of service 2024  7:23 AM    To assist coordination of care and communication with nursing and staff, this note may be preliminary early in the day, but finalized by end of the day.        Assessment and Plan:     Gluteal cleft wound - POA. No SIRS criteria, not septic, no abscess on CT, has not failed outpatient Abx. GPC on wound Cx.  Blood Cx NGTD. For now empirically on Vacno and Zosyn. Prn oxycodone and IV morphine if needed for pain. General surgery did debridement on . DC home on PO doxycyline and Augmentin    Diabetes mellitus type 2 - Diabetic diet when eating.  SSI per protocol.  Start home Lantus and mealtime insulin, needs PCP follow up.  A1c 12.7.    Morbid obesity / Hypoalbuminemia - Advise weight loss and testing for sleep apnea as outpatient    Hepatic steatosis - Noted on CT.  Negative hepatitis panel      History of ITP - CBC is stable       Subjective:     Chief Complaint:  wound pain    ROS:  (bold if positive, if negative)    Tolerating PT   NPO        Objective:     Last 24hrs VS reviewed since prior progress note. Most recent are:    Vitals:    24 2110 24 2243 24 0400   BP: 112/69 112/69 119/63 117/64   Pulse: 79 79 78 68   Resp: 21 20  15   Temp: 97.3 °F (36.3 °C) 97.3 °F (36.3 °C) 98.4 °F (36.9 °C) 97.2 °F (36.2 °C)   TempSrc:  Oral  Oral   SpO2: 94% 94% 98% 97%   Height:          @[id number]@       Intake/Output Summary (Last 24 hours) at 2024 0723  Last data filed at 2024 1053  Gross per 24 hour   Intake 250 ml   Output --   Net 250 ml        Physical Exam:    Gen:  Morbid obese, in no acute distress  HEENT:  Pink conjunctivae, PERRL, hearing intact to voice, moist mucous membranes  Neck:  Supple, without masses, thyroid non-tender  Resp:  No accessory muscle use, clear breath sounds without wheezes rales

## 2024-04-05 NOTE — ANESTHESIA POSTPROCEDURE EVALUATION
Department of Anesthesiology  Postprocedure Note    Patient: Sonal Cottrell  MRN: [id number]  YOB: 1970  Date of evaluation: 4/5/2024    Procedure Summary       Date: 04/04/24 Room / Location: [facility] MAIN OR F6 / [facility] MAIN OR    Anesthesia Start: 1028 Anesthesia Stop: 1158    Procedure: LEFT GLUTEAL WOUND SHARP DEBRIDEMENT SKIN SUBCUTANEOUS TISSUE DRAINAIGE OF ABSCESS COMPLICATED (Left: Buttocks) Diagnosis:       Wound of gluteal cleft, left, initial encounter      (Wound of gluteal cleft, left, initial encounter [S31.829A])    Surgeons: Sanjay Gabriel MD Responsible Provider: Chrissy Reeder MD    Anesthesia Type: General ASA Status: 3            Anesthesia Type: General    Nallely Phase I: Nallely Score: 10    Nallely Phase II:      Anesthesia Post Evaluation    Patient location during evaluation: PACU  Patient participation: complete - patient participated  Level of consciousness: awake  Airway patency: patent  Nausea & Vomiting: no vomiting and no nausea  Cardiovascular status: hemodynamically stable  Respiratory status: acceptable  Hydration status: stable  Pain management: adequate    No notable events documented.

## 2024-04-05 NOTE — DISCHARGE INSTRUCTIONS
HOSPITALIST DISCHARGE INSTRUCTIONS  NAME:  Sonal Cottrell   :  1970   MRN:  [id number]     Date/Time:  2024 10:05 AM    ADMIT DATE: 4/3/2024     DISCHARGE DATE: 2024     DISCHARGE DIAGNOSIS:  Gluteal cleft wound    DISCHARGE INSTRUCTIONS:  Thank you for allowing us to participate in your care. Your discharging Hospitalist is Roe Nicolas MD. You were admitted for evaluation and treatment of the following:    Gluteal cleft wound - We consulted surgery.  They cleaned out wound.  At home take 7 days of doxycyline and Augmentin.  We have arrange home wound care     Diabetes mellitus type 2 - Eat a diabetic diet. Start using Lantus and mealtime insulin at home.  Your A1c is 12.7.     Morbid obesity / Hypoalbuminemia - We advise weight loss and outpatient testing for sleep apnea      Hepatic steatosis - Noted on CT scan.  Related to diabetes and obesity      History of ITP - This is stable      MEDICATIONS:    It is important that you take the medication exactly as they are prescribed.   Keep your medication in the bottles provided by the pharmacist and keep a list of the medication names, dosages, and times to be taken in your wallet.   Do not take other medications without consulting your doctor.             If you experience any of the following symptoms then please call your primary care physician or return to the emergency room if you cannot get hold of your doctor:  Fever, chills, nausea, vomiting, diarrhea, change in mentation, falling, bleeding, shortness of breath    Follow Up:  Please call the below provider to arrange hospital follow up appointment      Rosalia Rodriguez MD  [address] [facility] [address] [phone]    Schedule an appointment as soon as possible for a visit in 2 day(s)      Sanjay Gabriel MD  [address] [address] [address] [phone]    Schedule an appointment as soon as possible for a visit in 2 day(s)        For questions

## 2024-04-05 NOTE — PROGRESS NOTES
Nurse handed patient a copy of discharge instructions which have been read and explained to patient. New medications were read and explained to patient, patient verbalized understanding. Patient aware that prescriptions have been electronically sent to their pharmacy. Insulin administration and education given during lunch. Opportunity for questions and clarification offered. Removed patient's IV access with no complications. Vital signs stable. Patient sent with all belongings.

## 2024-04-05 NOTE — PROGRESS NOTES
[facility] Dosing Services: Antimicrobial Stewardship Daily Doc  Consult for antibiotic dosing of vancomycin by Dr. Montiel  Indication: SSTI (Left buttock wound. S/p I&D on 4/1)  Day of Therapy: 2    Ht Readings from Last 1 Encounters:   04/03/24 1.575 m (5' 2\")        Wt Readings from Last 1 Encounters:   04/01/24 99.8 kg (220 lb)      Vancomycin therapy:  Loading dose: Vancomycin 2500 mg x1 dose given 4/3 @ 1350  Maintenance dose: Vancomycin 1250 mg IV every 18 hours   Dose calculated to approximate a           a. Target AUC/GALINDO of 400-600          b. Trough of 15-20 mcg/mL   Last level: 11.4 mcg/mL (drawn @ 1812) ~ 12 hour level    A/Plan:   Current regimen confirmed sub-therapeutic (per insight rx predicts , Tr 4.9, T1/2 = 6.09 hours)  Will dose adjust to vancomycin 1000 mg IV every 8 hours (predicts , Tr 15.9)    Non-Kinetic Antimicrobial Dosing Regimen:   Current Regimen:  Zosyn 3.375g q8h  Recommendation: continue      Other Antimicrobial   (not dosed by pharmacist)    Cultures 4/4 Anaerobic Cx: in process  4/4 Fungus Cx: in process  4/4 Wound Cx: in process  4/3 Wound Cx: +1 GPC in pairs in chains  4/3 Blood Cx: NGTD - prelim  4/3 Blood Cx: NGTD - prelim     Serum Creatinine Lab Results   Component Value Date/Time    CREATININE 0.61 04/04/2024 02:15 AM          Creatinine Clearance Estimated Creatinine Clearance: 118 mL/min (based on SCr of 0.61 mg/dL).     Temp Temp: 98.5 °F (36.9 °C) (Oral)       WBC Lab Results   Component Value Date/Time    WBC 8.4 04/03/2024 11:40 AM          Procalcitonin Lab Results   Component Value Date/Time    PROCAL 0.28 04/04/2024 08:14 AM      For Antifungals, Metronidazole and Nafcillin: Lab Results   Component Value Date/Time    ALT 51 04/03/2024 11:40 AM    AST 33 04/03/2024 11:40 AM        Pharmacist: Charles Olsen, [facility]    [phone]

## 2024-04-05 NOTE — DISCHARGE SUMMARY
Physician Discharge Summary     Patient ID:  Sonal Cottrell  [id number]  53 y.o.  1970    Admit date: 4/3/2024    Discharge date of service and time: 4/5/2024  Greater than 30 minutes were spent providing discharge related services for this patient    Admission Diagnoses: Abscess [L02.91]  Cellulitis and abscess of leg [L03.119, L02.419]    Discharge Diagnoses:    Principal Diagnosis   Gluteal cleft wound                                             Hospital Course and other diagnoses  Gluteal cleft wound - POA. No SIRS criteria, not septic, no abscess on CT, has not failed outpatient Abx. GPC on wound Cx.  Blood Cx NGTD. For now empirically on Vacno and Zosyn. Prn oxycodone and IV morphine if needed for pain. General surgery did debridement on 4/4. DC home on PO doxycyline and Augmentin     Diabetes mellitus type 2 - Diabetic diet when eating.  SSI per protocol.  Start home Lantus and mealtime insulin, needs PCP follow up.  A1c 12.7.     Morbid obesity / Hypoalbuminemia - Advise weight loss and testing for sleep apnea as outpatient     Hepatic steatosis - Noted on CT.  Negative hepatitis panel      History of ITP - CBC is stable     PCP: None, None    Consults: general surgery    Significant Diagnostic Studies: See Hospital Course    Discharged home in improved condition.    Discharge Exam:  BP: 117/64   Pulse: 68   Resp: 15   Temp: 97.2 °F (36.2 °C)   TempSrc: Oral   SpO2: 97%   Weight:  Height:  100 kg  157 cm         Gen:  Morbid obese, in no acute distress  HEENT:  Pink conjunctivae, PERRL, hearing intact to voice, moist mucous membranes  Neck:  Supple, without masses, thyroid non-tender  Resp:  No accessory muscle use, clear breath sounds without wheezes rales or rhonchi  Card:  No murmurs, normal S1, S2 without thrills, bruits or peripheral edema  Abd:  Soft, non-tender, non-distended, normoactive bowel sounds are present, no mass  Lymph:  No cervical or inguinal adenopathy  Musc:  No cyanosis or

## 2024-04-05 NOTE — CARE COORDINATION
ACCEPTED by [facility] home health, skilled nursing for wound care management.  AVS updated.    Case management Follow-up    RUR 6 (Score %) low   Is This a Readmission NO    Current status  Patient discussed during interdisciplinary rounds.  Patient continues to require medical management including ongoing assessment and monitoring.  Patient accepted by [facility]. Family member to be here later today, will meet with Cecile Charles for wound care instructions.  Patient agreeable with plan. Discharge later today.    Transition of Care Plan  Monitor patient status and response to treatment.  Patient continues to require medical management.  CM needs: home health for wound management  [facility] to follow  Family to transport home.  CM to monitor progress and recommendations.    Maris Hernandez, RN, MSN/Care manager

## 2024-04-05 NOTE — PROGRESS NOTES
Hospital follow up scheduled for Tuesday April 16th 2024 at 9:30 AM with Doctor Beltre. Office asks that patient arrive 15 mins early, bring photo ID, medication list, any paper work needed to be filled out and insurance card.    -Vivienne Lakewood  Case Management Specialist

## 2024-04-05 NOTE — CONSULTS
[facility]  DIABETES MANAGEMENT CONSULT    Consulted by Provider for advanced nursing evaluation and care for inpatient blood glucose management.    Evaluation and Action Plan   Sonal Cottrell is a 53 y.o. female with history of ITP, but no prior history of diabetes, who was admitted for worsening left gluteal ulcer. She was seen in the ED on 4/1 for same issue, wound was drained and packed, and she was sent home on antibiotics. However, she returned due to foul-smelling purulent drainage. I&D done by surgery on 4/4 and patient remains on antibiotics. Of note, her BG with first ED visit was 442 and then 401 when she came back in. She states both her parents have diabetes, but she has never been given a diagnosis. She had a medical visit last year where she was told her BG was high, but was not given treatment. She did try to change her diet at that point, but admits to falling back into old habits. Admits to being chronically fatigued. We discussed the significance of her A1c (12.7%) versus what is normal. Reviewed what diabetes is, normal blood sugars, and risks associated with uncontrolled diabetes (such as infection). Also reviewed healthy plate and CHO control, how often to check BG, insulin pens use and care, s/s hypoglycemia and how to treat, importance of exercise, and importance of follow up with PCP and outpatient PDH classes. Patient receptive to learning, but is still trying to process all of it. Mother at bedside and supportive. Both offered chance to ask questions/voice concerns. Will continue to review home diabetes management tomorrow.     4/5 - BG high of 464 overnight due to steroid that was given yesterday. . Lantus dose increased this am. Patient for discharge today. Discussed home plan with patient, documenting BG, PDH classes, and follow up with PCP. She is motivated to get diabetes under control and plans on making necessary changes. Dr. Nicolas already put in

## 2024-04-05 NOTE — PLAN OF CARE
Problem: Safety - Adult  Goal: Free from fall injury  4/5/2024 1036 by Ruth Ann Eldridge, RN  Out[facility] [place]  4/5/2024 0127 by Dontae Hughes LPN  Outcome: Progressing

## 2024-04-05 NOTE — WOUND CARE
Wound for education for home care\"   and mother at bedside.  Instructed  on wound packing and watching wound nurse to sound care, all questions answered, supplies given  Cecile Charles RN  wound

## 2024-04-06 LAB
BACTERIA SPEC CULT: NORMAL
GRAM STN SPEC: NORMAL
SERVICE CMNT-IMP: NORMAL

## 2024-04-06 RX ORDER — GLUCOSAMINE HCL/CHONDROITIN SU 500-400 MG
CAPSULE ORAL
Qty: 90 STRIP | Refills: 0 | Status: SHIPPED | OUTPATIENT
Start: 2024-04-06

## 2024-04-06 RX ORDER — BLOOD-GLUCOSE METER
1 KIT MISCELLANEOUS DAILY
Qty: 1 KIT | Refills: 0 | Status: SHIPPED | OUTPATIENT
Start: 2024-04-06

## 2024-04-06 RX ORDER — LANCETS 30 GAUGE
1 EACH MISCELLANEOUS 3 TIMES DAILY
Qty: 200 EACH | Refills: 5 | Status: SHIPPED | OUTPATIENT
Start: 2024-04-06

## 2024-04-07 LAB
BACTERIA SPEC CULT: NORMAL
BACTERIA SPEC CULT: NORMAL
SERVICE CMNT-IMP: NORMAL
SERVICE CMNT-IMP: NORMAL

## 2024-04-08 ENCOUNTER — HOME CARE VISIT (OUTPATIENT)
Facility: HOME HEALTH | Age: 54
End: 2024-04-08
Payer: COMMERCIAL

## 2024-04-08 ENCOUNTER — HOME CARE VISIT (OUTPATIENT)
Dept: HOME HEALTH SERVICES | Facility: HOME HEALTH | Age: 54
End: 2024-04-08

## 2024-04-08 VITALS
HEART RATE: 99 BPM | TEMPERATURE: 97.3 F | RESPIRATION RATE: 18 BRPM | DIASTOLIC BLOOD PRESSURE: 80 MMHG | OXYGEN SATURATION: 97 % | SYSTOLIC BLOOD PRESSURE: 130 MMHG

## 2024-04-08 LAB
BACTERIA SPEC CULT: NORMAL
SERVICE CMNT-IMP: NORMAL

## 2024-04-08 PROCEDURE — G0299 HHS/HOSPICE OF RN EA 15 MIN: HCPCS

## 2024-04-08 ASSESSMENT — ENCOUNTER SYMPTOMS: PAIN LOCATION - PAIN QUALITY: TINGLY

## 2024-04-09 ENCOUNTER — HOME CARE VISIT (OUTPATIENT)
Dept: HOME HEALTH SERVICES | Facility: HOME HEALTH | Age: 54
End: 2024-04-09
Payer: COMMERCIAL

## 2024-04-09 NOTE — HOME HEALTH
Reason for referral, [facility] SUMMARY of clinical condition: Sonal Cottrell admitted to home care for Unspecified open wound of unspecified buttock. Nursing needed for wound care.     Clinical Assessment/Skilled reason for admission to home health (What this means for the patient overall and need for ongoing skilled care): Sonal Cottrell is a 53 year old female who lives in apartment with  and dog. Their apartment is in the top floor of a house over a garage, and patient reports this is a short-term rental, that she and  [place]oved [place] in [place] and are looking for more permanent housing. Ms. Caraballo is s/p hospitalization 4/3 through 4/5 for gluteal cleft wound s/p wound debridement and drainage of abscess 4/4. Patient has left buttock abscess wound measuring 4x2x3 with tunneling at 8 o'clock and 4 o'clock. Wound still has penrose drain and sutures.   was taught during hospital stay how to pack wound and has done wound care since hospital discharge, but requested to see demonstration of wound care today by clinician.  believes he can do wound care at next visit and daily thereafter. Patient has been taking Omari supplement twice daily to assist with wound healing. Patient has follow-up appointment with surgeon this Thursday for possible drain removal. Patient was also recently diagnosed with Diabetes, was seen by diabetes educator and started on insulin at recent hospital stay. She was able to get insulin prescriptions but not the glucometer yet. They said they are supposed to get this by tomorrow. Education provided on glucose monitoring, antibiotics, insulin, hypoglycemia s/sx and wound care. Homebound criteria was reviewed with patient and the need to stay homebound while on service with home health. Patient is still weak and fatigued from recent infection/hospital stay and currently out on medical leave from job. It was explained that home health would discharge when she is no longer

## 2024-04-10 ENCOUNTER — HOME CARE VISIT (OUTPATIENT)
Dept: HOME HEALTH SERVICES | Facility: HOME HEALTH | Age: 54
End: 2024-04-10
Payer: COMMERCIAL

## 2024-04-12 ENCOUNTER — HOME CARE VISIT (OUTPATIENT)
Facility: HOME HEALTH | Age: 54
End: 2024-04-12
Payer: COMMERCIAL

## 2024-04-12 ENCOUNTER — TELEPHONE (OUTPATIENT)
Facility: HOSPITAL | Age: 54
End: 2024-04-12

## 2024-04-12 VITALS
SYSTOLIC BLOOD PRESSURE: 138 MMHG | DIASTOLIC BLOOD PRESSURE: 90 MMHG | OXYGEN SATURATION: 97 % | RESPIRATION RATE: 18 BRPM | HEART RATE: 87 BPM | TEMPERATURE: 98.2 F

## 2024-04-12 PROCEDURE — G0299 HHS/HOSPICE OF RN EA 15 MIN: HCPCS

## 2024-04-12 NOTE — HOME HEALTH
Subjective: The stitches fell out a couple days ago and the surgeon saw my wound yesterday.  Falls since last visit No(if yes complete the Fall Tracking Form and include bsrifallreport):   Caregiver involvement changes: n/a  Home health supplies by type and quantity ordered/delivered this visit include: island dressings, foam dressings    Clinician asked if patient has had any physician contact since last home care visit and patient states: YES  Clinician asked if patient has any new or changed medications and patient states:  NO   If Yes, were medications reconciled? N/A   Was the certifying physician notified of changes in medications? N/A     Clinical assessment (what this visit means for the patient overall and need for ongoing skilled care) and progress or lack of progress towards SPECIFIC goals: Patient remains at risk for infection due to presence of wound.  Wound healing goal not met.  Skilled nursing needed for continued wound care and wound assessment.   appropriately demonstrated dressing change and competency has been documented.  Patient still has not yet received glucometer but expects it soon.  They are able to teach back s/s or hyper and hypoglycemia with 100% accuracy including corrective actions to take if these symptoms occur.  Upon receipt of glucometer they will require teaching on properly testing blood sugar.    Written Teaching Material Utilized: N/A    Interdisciplinary communication with: Dr. Gabriel Physician for the purpose of wound care orders    Discharge planning as follows: Is no longer homebound, Per physician order and When goals are met    Specific plan for next visit: wound care and assessment, teaching on testing blood sugar if glucometer present.

## 2024-04-15 LAB
BACTERIA SPEC CULT: NORMAL
SERVICE CMNT-IMP: NORMAL

## 2024-04-17 ENCOUNTER — HOME CARE VISIT (OUTPATIENT)
Facility: HOME HEALTH | Age: 54
End: 2024-04-17
Payer: COMMERCIAL

## 2024-04-17 PROCEDURE — G0299 HHS/HOSPICE OF RN EA 15 MIN: HCPCS

## 2024-04-18 VITALS
RESPIRATION RATE: 18 BRPM | SYSTOLIC BLOOD PRESSURE: 124 MMHG | DIASTOLIC BLOOD PRESSURE: 82 MMHG | HEART RATE: 86 BPM | OXYGEN SATURATION: 96 % | TEMPERATURE: 98.5 F

## 2024-04-18 ASSESSMENT — ENCOUNTER SYMPTOMS: STOOL DESCRIPTION: SOFT FORMED

## 2024-04-18 NOTE — HOME HEALTH
Subjective: I still haven't gotten my glucometer, they are on backorder.   Falls since last visit No(if yes complete the Fall Tracking Form and include bsrifallreport):   Caregiver involvement changes: none  Home health supplies by type and quantity ordered/delivered this visit include: foam, skin prep wands    Clinician asked if patient has had any physician contact since last home care visit and patient states: YES  Clinician asked if patient has any new or changed medications and patient states:  YES has nystatin ointment and powder  If Yes, were medications reconciled? NO just the two new medications   Was the certifying physician notified of changes in medications? N/A     Clinical assessment (what this visit means for the patient overall and need for ongoing skilled care) and progress or lack of progress towards SPECIFIC goals: Patient with buttocks abscess requiring SN for dressing changes. Patient spouse independent in dressing changes on non-nursing days. Patient asking about abx refill now that she has completed the current course. Patient states she attempted to refill on pharmacy website but it was awaiting provider approval. Patient pain is controlled. Patient is waiting on glucometer but is able to report s/s of high or low blood sugars. Patient at risk for wound complications, infection and DM complications. Goals not met yet for wound healing.     Written Teaching Material Utilized: N/A    Interdisciplinary communication with: Duyen for Dr Gabriel for the purpose of pt requesting abx refill and wound clinic referral. SN spoke with Duyen. No need to refill abx at this time and pt can set up appt with wound clinic if she would like as her insurance does not require a referral. SN sent information to pt in text message.     Discharge planning as follows: When wound is 100% healed and Per physician order    Specific plan for next visit: wound care, check on status of glucometer

## 2024-04-19 ENCOUNTER — HOME CARE VISIT (OUTPATIENT)
Facility: HOME HEALTH | Age: 54
End: 2024-04-19
Payer: COMMERCIAL

## 2024-04-19 VITALS
SYSTOLIC BLOOD PRESSURE: 138 MMHG | RESPIRATION RATE: 16 BRPM | TEMPERATURE: 97.4 F | OXYGEN SATURATION: 96 % | DIASTOLIC BLOOD PRESSURE: 84 MMHG | HEART RATE: 78 BPM

## 2024-04-19 PROCEDURE — G0299 HHS/HOSPICE OF RN EA 15 MIN: HCPCS

## 2024-04-19 ASSESSMENT — ENCOUNTER SYMPTOMS: PAIN LOCATION - PAIN QUALITY: IRRITATING

## 2024-04-22 LAB
BACTERIA SPEC CULT: NORMAL
SERVICE CMNT-IMP: NORMAL

## 2024-04-24 ENCOUNTER — HOME CARE VISIT (OUTPATIENT)
Facility: HOME HEALTH | Age: 54
End: 2024-04-24
Payer: COMMERCIAL

## 2024-04-24 VITALS
TEMPERATURE: 97.4 F | HEART RATE: 81 BPM | DIASTOLIC BLOOD PRESSURE: 86 MMHG | SYSTOLIC BLOOD PRESSURE: 142 MMHG | RESPIRATION RATE: 18 BRPM | OXYGEN SATURATION: 97 %

## 2024-04-24 PROCEDURE — G0299 HHS/HOSPICE OF RN EA 15 MIN: HCPCS

## 2024-04-24 ASSESSMENT — ENCOUNTER SYMPTOMS
PAIN LOCATION - PAIN QUALITY: SORE
STOOL DESCRIPTION: SOFT FORMED

## 2024-04-24 NOTE — HOME HEALTH
Subjective: We move on 5/20. What do I do about home health? My mom brought me a glucometer but I don't know how to use it.   Falls since last visit No(if yes complete the Fall Tracking Form and include bsrifallreport):   Caregiver involvement changes: none  Home health supplies by type and quantity ordered/delivered this visit include: none    Clinician asked if patient has had any physician contact since last home care visit and patient states: NO  Clinician asked if patient has any new or changed medications and patient states:  NO   If Yes, were medications reconciled? N/A   Was the certifying physician notified of changes in medications? N/A     Clinical assessment (what this visit means for the patient overall and need for ongoing skilled care) and progress or lack of progress towards SPECIFIC goals: Patient with wound to buttocks requiring SN for dressing changes and to monitor for s/s of infection. Pt is newly diabetic and is waiting on a glucometer, but her mother brought her one to use in the meantime as she is taking insulin with no way to tell what her actual blood sugars are-only by s/s. SN demonstrated how to use the pen to obtain sample and how to use strips with glucometer. Pt verbalized how to do this with 100% accuracy. Patient wound drainage has returned to a serosanginous color and patient is taking antibiotics. Patient is at risk for wound complications, DM complications, infections. Goals not met.     Written Teaching Material Utilized: N/A    Interdisciplinary communication with: Geetha Clinical Manager for the purpose of home health in [facility]     Discharge planning as follows: When wound is 100% healed, Per physician order and When goals are met    Specific plan for next visit: wound care, assess how checking blood sugars is going

## 2024-04-25 ENCOUNTER — HOME CARE VISIT (OUTPATIENT)
Facility: HOME HEALTH | Age: 54
End: 2024-04-25
Payer: COMMERCIAL

## 2024-04-29 LAB
BACTERIA SPEC CULT: NORMAL
SERVICE CMNT-IMP: NORMAL

## 2024-04-30 ENCOUNTER — HOME CARE VISIT (OUTPATIENT)
Facility: HOME HEALTH | Age: 54
End: 2024-04-30
Payer: COMMERCIAL

## 2024-04-30 ENCOUNTER — HOME CARE VISIT (OUTPATIENT)
Dept: HOME HEALTH SERVICES | Facility: HOME HEALTH | Age: 54
End: 2024-04-30
Payer: COMMERCIAL

## 2024-04-30 VITALS
RESPIRATION RATE: 16 BRPM | HEART RATE: 74 BPM | DIASTOLIC BLOOD PRESSURE: 78 MMHG | OXYGEN SATURATION: 98 % | SYSTOLIC BLOOD PRESSURE: 140 MMHG | TEMPERATURE: 97.4 F

## 2024-04-30 PROCEDURE — G0299 HHS/HOSPICE OF RN EA 15 MIN: HCPCS

## 2024-04-30 NOTE — HOME HEALTH
Subjective: My wound feels more tender, but I think I've been sitting on it too much.  Falls since last visit No(if yes complete the Fall Tracking Form and include bsrifallreport):   Caregiver involvement changes: n/a  Home health supplies by type and quantity ordered/delivered this visit include: vashe, foam dressing, packing strips    Clinician asked if patient has had any physician contact since last home care visit and patient states: NO  Clinician asked if patient has any new or changed medications and patient states:  NO   If Yes, were medications reconciled? N/A   Was the certifying physician notified of changes in medications? N/A     Clinical assessment (what this visit means for the patient overall and need for ongoing skilled care) and progress or lack of progress towards SPECIFIC goals: Patient remains at risk for infection due to presence of wound.  Wound healing goal not met.  Skilled nursing needed for continued wound care and wound assessment.  Patient doing well with glucometer and checking her blood sugars.  Teaching completed on diabetic foot care but will need reinforcement.    Written Teaching Material Utilized: N/A    Interdisciplinary communication with: Vivienne Mejia RN for the purpose of POC collaboration    Discharge planning as follows: Is no longer homebound, Per physician order and When goals are met    Specific plan for next visit: wound care and assessment, diabetic management education

## 2024-05-06 LAB
BACTERIA SPEC CULT: NORMAL
SERVICE CMNT-IMP: NORMAL

## 2024-05-08 ENCOUNTER — HOME CARE VISIT (OUTPATIENT)
Facility: HOME HEALTH | Age: 54
End: 2024-05-08
Payer: COMMERCIAL

## 2024-05-08 VITALS
OXYGEN SATURATION: 95 % | RESPIRATION RATE: 18 BRPM | TEMPERATURE: 97.8 F | HEART RATE: 82 BPM | SYSTOLIC BLOOD PRESSURE: 136 MMHG | DIASTOLIC BLOOD PRESSURE: 82 MMHG

## 2024-05-08 PROCEDURE — G0299 HHS/HOSPICE OF RN EA 15 MIN: HCPCS

## 2024-05-08 NOTE — HOME HEALTH
Subjective: I'm barely having any pain.  Falls since last visit No(if yes complete the Fall Tracking Form and include bsrifallreport):   Caregiver involvement changes: n/a  Home health supplies by type and quantity ordered/delivered this visit include: foam dressings, packing strips, qtips    Clinician asked if patient has had any physician contact since last home care visit and patient states: NO  Clinician asked if patient has any new or changed medications and patient states:  NO   If Yes, were medications reconciled? N/A   Was the certifying physician notified of changes in medications? N/A     Clinical assessment (what this visit means for the patient overall and need for ongoing skilled care) and progress or lack of progress towards SPECIFIC goals: Patient remains at risk for infection due to presence of wound.  Wound healing goal not met.  Skilled nursing needed for continued wound care and wound assessment.    Written Teaching Material Utilized: N/A    Interdisciplinary communication with: N/A    Discharge planning as follows: Is no longer homebound, Per physician order and When goals are met    Specific plan for next visit: wound care and assessment, discharge as patient is moving out of the service area.

## 2024-05-15 ENCOUNTER — HOME CARE VISIT (OUTPATIENT)
Facility: HOME HEALTH | Age: 54
End: 2024-05-15
Payer: COMMERCIAL

## 2024-05-15 VITALS
SYSTOLIC BLOOD PRESSURE: 138 MMHG | OXYGEN SATURATION: 98 % | DIASTOLIC BLOOD PRESSURE: 78 MMHG | TEMPERATURE: 97.5 F | RESPIRATION RATE: 16 BRPM | HEART RATE: 80 BPM

## 2024-05-15 PROCEDURE — G0299 HHS/HOSPICE OF RN EA 15 MIN: HCPCS

## 2024-05-15 NOTE — HOME HEALTH
Subjective: Dr. Gabriel was happy with how the wound is healing.  The wound is getting so much smaller.  Falls since last visit No(if yes complete the Fall Tracking Form and include bsrifallreport):   Caregiver involvement changes: n/a    Clinician asked if patient has had any physician contact since last home care visit and patient states: YES  Clinician asked if patient has any new or changed medications and patient states:  NO   If Yes, were medications reconciled? N/A   Was the certifying physician notified of changes in medications? N/A     A list of reconciled medications has been given to the patient/caregiver .      Clinical assessment (what this visit means for the patient overall and need for ongoing skilled care) and progress or lack of progress towards SPECIFIC goals: Patient was provided with wound care, medication education, and diabetic management education. Wound not yet healed but patient is moving out of the service area this weekend.  She had an appointment yesterday, 5/14/24 with Dr. Gabriel who cleared her and stated she did not need any further follow up appointments unless complications arise.  Wound healing well.  Patient has an appointment to establish with a new primary care on 6/5/24.   competent in daily dressing changes.  Able to verbalize s/s of wound complications or infection with 100% accuracy.    Discharge Instructions:  Continue with wound care as ordered by Dr. Gabriel.  Continue with a heart healthy and diabetic diet.  Continue all medications as prescribed.  Keep your doctor's appointment with Malgorzata Mac NP on 6/5/24.    Written Teaching Material Utilized: Discharge instructions.    Instructed patient/caregiver on the following: Take all medications exactly as prescribed by physician. Updated medication list present in the home at discharge, Keep all scheduled medical appointments, Wash hands frequently to control the spread of infection, Follow safety and fall

## 2024-06-03 SDOH — HEALTH STABILITY: PHYSICAL HEALTH: ON AVERAGE, HOW MANY MINUTES DO YOU ENGAGE IN EXERCISE AT THIS LEVEL?: 20 MIN

## 2024-06-03 SDOH — HEALTH STABILITY: PHYSICAL HEALTH: ON AVERAGE, HOW MANY DAYS PER WEEK DO YOU ENGAGE IN MODERATE TO STRENUOUS EXERCISE (LIKE A BRISK WALK)?: 3 DAYS

## 2024-06-05 ENCOUNTER — OFFICE VISIT (OUTPATIENT)
Age: 54
End: 2024-06-05
Payer: COMMERCIAL

## 2024-06-05 VITALS
SYSTOLIC BLOOD PRESSURE: 137 MMHG | TEMPERATURE: 97.2 F | BODY MASS INDEX: 39.93 KG/M2 | WEIGHT: 217 LBS | RESPIRATION RATE: 18 BRPM | HEART RATE: 80 BPM | HEIGHT: 62 IN | OXYGEN SATURATION: 98 % | DIASTOLIC BLOOD PRESSURE: 74 MMHG

## 2024-06-05 DIAGNOSIS — E11.9 TYPE 2 DIABETES MELLITUS WITHOUT COMPLICATION, UNSPECIFIED WHETHER LONG TERM INSULIN USE (HCC): ICD-10-CM

## 2024-06-05 DIAGNOSIS — I25.10 CORONARY ARTERY DISEASE INVOLVING NATIVE CORONARY ARTERY OF NATIVE HEART WITHOUT ANGINA PECTORIS: ICD-10-CM

## 2024-06-05 DIAGNOSIS — E66.9 OBESITY (BMI 30-39.9): ICD-10-CM

## 2024-06-05 DIAGNOSIS — Z76.89 ENCOUNTER TO ESTABLISH CARE: Primary | ICD-10-CM

## 2024-06-05 DIAGNOSIS — K76.0 FATTY LIVER: ICD-10-CM

## 2024-06-05 DIAGNOSIS — K82.8 GALLBLADDER SLUDGE: ICD-10-CM

## 2024-06-05 DIAGNOSIS — E28.2 PCOS (POLYCYSTIC OVARIAN SYNDROME): ICD-10-CM

## 2024-06-05 PROCEDURE — 3046F HEMOGLOBIN A1C LEVEL >9.0%: CPT | Performed by: NURSE PRACTITIONER

## 2024-06-05 PROCEDURE — 99203 OFFICE O/P NEW LOW 30 MIN: CPT | Performed by: NURSE PRACTITIONER

## 2024-06-05 RX ORDER — SEMAGLUTIDE 0.68 MG/ML
0.25 INJECTION, SOLUTION SUBCUTANEOUS
Qty: 3 ML | Refills: 0 | Status: SHIPPED | OUTPATIENT
Start: 2024-06-05

## 2024-06-05 RX ORDER — INSULIN HUMAN 500 [IU]/ML
INJECTION, SOLUTION SUBCUTANEOUS
Qty: 2 EACH | Refills: 0
Start: 2024-06-05

## 2024-06-05 RX ORDER — INSULIN GLARGINE 100 [IU]/ML
40 INJECTION, SOLUTION SUBCUTANEOUS NIGHTLY
Qty: 5 ADJUSTABLE DOSE PRE-FILLED PEN SYRINGE | Refills: 0
Start: 2024-06-05 | End: 2024-07-05

## 2024-06-05 SDOH — ECONOMIC STABILITY: FOOD INSECURITY: WITHIN THE PAST 12 MONTHS, THE FOOD YOU BOUGHT JUST DIDN'T LAST AND YOU DIDN'T HAVE MONEY TO GET MORE.: NEVER TRUE

## 2024-06-05 SDOH — ECONOMIC STABILITY: FOOD INSECURITY: WITHIN THE PAST 12 MONTHS, YOU WORRIED THAT YOUR FOOD WOULD RUN OUT BEFORE YOU GOT MONEY TO BUY MORE.: NEVER TRUE

## 2024-06-05 SDOH — ECONOMIC STABILITY: INCOME INSECURITY: HOW HARD IS IT FOR YOU TO PAY FOR THE VERY BASICS LIKE FOOD, HOUSING, MEDICAL CARE, AND HEATING?: NOT VERY HARD

## 2024-06-05 SDOH — ECONOMIC STABILITY: HOUSING INSECURITY
IN THE LAST 12 MONTHS, WAS THERE A TIME WHEN YOU DID NOT HAVE A STEADY PLACE TO SLEEP OR SLEPT IN A SHELTER (INCLUDING NOW)?: NO

## 2024-06-05 ASSESSMENT — PATIENT HEALTH QUESTIONNAIRE - PHQ9
10. IF YOU CHECKED OFF ANY PROBLEMS, HOW DIFFICULT HAVE THESE PROBLEMS MADE IT FOR YOU TO DO YOUR WORK, TAKE CARE OF THINGS AT HOME, OR GET ALONG WITH OTHER PEOPLE: NOT DIFFICULT AT ALL
3. TROUBLE FALLING OR STAYING ASLEEP: NOT AT ALL
7. TROUBLE CONCENTRATING ON THINGS, SUCH AS READING THE NEWSPAPER OR WATCHING TELEVISION: NOT AT ALL
1. LITTLE INTEREST OR PLEASURE IN DOING THINGS: NOT AT ALL
8. MOVING OR SPEAKING SO SLOWLY THAT OTHER PEOPLE COULD HAVE NOTICED. OR THE OPPOSITE, BEING SO FIGETY OR RESTLESS THAT YOU HAVE BEEN MOVING AROUND A LOT MORE THAN USUAL: NOT AT ALL
2. FEELING DOWN, DEPRESSED OR HOPELESS: NOT AT ALL
9. THOUGHTS THAT YOU WOULD BE BETTER OFF DEAD, OR OF HURTING YOURSELF: NOT AT ALL
SUM OF ALL RESPONSES TO PHQ QUESTIONS 1-9: 0
SUM OF ALL RESPONSES TO PHQ QUESTIONS 1-9: 0
4. FEELING TIRED OR HAVING LITTLE ENERGY: NOT AT ALL
6. FEELING BAD ABOUT YOURSELF - OR THAT YOU ARE A FAILURE OR HAVE LET YOURSELF OR YOUR FAMILY DOWN: NOT AT ALL
SUM OF ALL RESPONSES TO PHQ9 QUESTIONS 1 & 2: 0
5. POOR APPETITE OR OVEREATING: NOT AT ALL
SUM OF ALL RESPONSES TO PHQ QUESTIONS 1-9: 0
SUM OF ALL RESPONSES TO PHQ QUESTIONS 1-9: 0

## 2024-06-05 NOTE — PROGRESS NOTES
Subjective:     CC: [facility]    Sonal Cottrell is a 53 y.o. female who presents today to establish care.    Grew up in [place]. Moved to [place]. She and her  just moved back here from [place] 2 weeks ago.     She was seeing a primary care doctor in [place].    Prior to her move in April, she was hospitalized for cellulitis and abscess of the left buttocks. She underwent debridement of the wound by general surgeon. She was treated with antibiotics. Prior to her move he released her from his care. She states she has 1 more month left of dressing changes. Her  does this for her. She has finished all the antibiotics.     While in the hospital she was diagnosed with type 2 diabetes on 4-3-24.    Lab Results   Component Value Date    LABA1C 12.7 (H) 04/03/2024     04/03/2024     She was started on Lantus isulin 50 untis daily with Humulin 10 units q meal TID.  Since then she has made a lot of changes to her diet and lost some weight.  Home BS have been runing in the 110s. No hypoglycemic episodes  Does not like insulin. It causes bruising and it is inconvenient.    She has a FH of diabetes.     Years ago she was dx with PCOS. She was started on Metformin but could not tolerate it.     Today I suggested she wean off and switch to Ozempic which is only a once a week injection.     She would also like a continuous glucometer as she has to stick herself 4 times daily.    Denies any s/s of peripheral neuropathy.      No CP or SOB.    No CKD.    Lab Results   Component Value Date    CREATININE 0.70 04/05/2024       Abdominal CT scan from 4-2024 showed a Fatty liver. LFTS were normal. She is trying to lose weight.    CT also showed gall bladder sludge but she denies any abdominal pain, NV, or diarrhea.    She has occasional dyspepsia.    CT also showed moderate coronary calcium build up but we did not discuss this today.     She has no hx of HTN.      Patient Active Problem List   Diagnosis    Hyperglycemia

## 2024-06-11 ENCOUNTER — TELEPHONE (OUTPATIENT)
Age: 54
End: 2024-06-11

## 2024-06-11 NOTE — TELEPHONE ENCOUNTER
You filled out a form for this pt to get a freestyle epifanio 3. I have a lot of issues with those forms and the company not receiving them. Please send to [facility] instead

## 2024-06-12 RX ORDER — BLOOD-GLUCOSE SENSOR
EACH MISCELLANEOUS
Qty: 6 EACH | Refills: 3 | Status: SHIPPED | OUTPATIENT
Start: 2024-06-12

## 2024-06-27 ENCOUNTER — TELEMEDICINE (OUTPATIENT)
Age: 54
End: 2024-06-27
Payer: COMMERCIAL

## 2024-06-27 DIAGNOSIS — I25.10 CORONARY ARTERY DISEASE INVOLVING NATIVE CORONARY ARTERY OF NATIVE HEART WITHOUT ANGINA PECTORIS: ICD-10-CM

## 2024-06-27 DIAGNOSIS — E11.65 TYPE 2 DIABETES MELLITUS WITH HYPERGLYCEMIA, WITHOUT LONG-TERM CURRENT USE OF INSULIN (HCC): Primary | ICD-10-CM

## 2024-06-27 PROCEDURE — 3046F HEMOGLOBIN A1C LEVEL >9.0%: CPT | Performed by: NURSE PRACTITIONER

## 2024-06-27 PROCEDURE — 99213 OFFICE O/P EST LOW 20 MIN: CPT | Performed by: NURSE PRACTITIONER

## 2024-06-27 RX ORDER — ACYCLOVIR 400 MG/1
TABLET ORAL
Qty: 1 EACH | Refills: 0 | Status: SHIPPED | OUTPATIENT
Start: 2024-06-27

## 2024-06-27 RX ORDER — ACYCLOVIR 400 MG/1
TABLET ORAL
Qty: 6 EACH | Refills: 3 | Status: SHIPPED | OUTPATIENT
Start: 2024-06-27

## 2024-06-27 NOTE — PROGRESS NOTES
2024    TELEHEALTH EVALUATION -- Audio/Visual    HPI:    Sonal Cottrell (:  1970) has requested an audio/video evaluation for the following concern(s):    Grew up in [place]. Moved to [place]. She and her  just moved back here from [place] 6 weeks ago.      She was seeing a primary care doctor in [place].     Prior to her move in April, she was hospitalized for cellulitis and abscess of the left buttocks. She underwent debridement of the wound by general surgeon. She was treated with antibiotics. Prior to her move he released her from his care. She states she has 1 more month left of dressing changes. Her  does this for her. She has finished all the antibiotics.      While in the hospital she was diagnosed with type 2 diabetes on 4-3-24 with an A1C of 12.7.     She was started on Lantus isulin 50 untis daily with Humulin 10 units q meal TID.  Since then she has made a lot of changes to her diet and lost some weight.  Does not like insulin. It causes bruising and it is inconvenient.    She has a FH of diabetes.      Years ago she was dx with PCOS. She was started on Metformin but could not tolerate it.      At the last OV I suggested she wean off and switch to Ozempic which is only a once a week injection. She was started on 0.25mg weekly and took her first shot a week ago. So far BS have been less than 150 so she has not had to use the mealtime insulin. Her lantus was reduced from 50 to 40 units daily.   She has only had a couple of episodes of hypoglycemia on days where she was doing a lot of yard work and did not eat.      She was prescribed the Free style epifanio continuous glucometer as she has to stick herself 4 times daily. Today she states she had to pay full price at the pharmacy. Will call and inquire.      Denies any s/s of peripheral neuropathy.      While in the hospital she had an abdominal CT that incidentally showed moderate coronary calcium build up. Today we discussed the importance of

## 2024-07-05 NOTE — TELEPHONE ENCOUNTER
Received wound care referral from [facility], called and LVM for patient to return call to office for scheduling

## 2024-07-15 NOTE — PROGRESS NOTES
Subjective:     CC: diabetes    Sonal Cottrell is a 53 y.o. female who presents today to follow up for type 2 diabetes.     Last seen in office 6-5-24, at which point she was establishing care.    Grew up in [place]. Moved to [place]. She and her  just moved back here from [place] 2 months ago.     She was seeing a primary care doctor in [place].    Prior to her move in April, she was hospitalized for cellulitis and abscess of the left buttocks. She underwent debridement of the wound by general surgeon. She was treated with IV antibiotics. Prior to her move he released her from his care. Today she states the wound has grown so small that her  cannot fit any packing into it anymore.       While in the hospital she was diagnosed with type 2 diabetes on 4-3-24.    Lab Results   Component Value Date    LABA1C 12.7 (H) 04/03/2024     04/03/2024     She was started on Lantus isulin 50 untis daily with Humulin 10 units q meal TID.  Since then she has made a lot of changes to her diet and lost some weight.  Does not like insulin. It causes bruising and it is inconvenient.        Could not tolerate Metformin, tried this years ago when she was dx with PCOS.      She was advised to wean off and switch to Ozempic which is only a once a week injection. She was started on 0.25mg weekly x 1 month. With this she noticed some constipation so she did not increase the dose. She wanted to see what her A1C was first. She has stopped lantus completely for the past 3 weeks.     States her blood sugars are \"regular.\"   She would rather take an oral medication if possible, if she needs to take anything at all.      She was prescribed the Free style epifanio continuous glucometer but according to the pharmacy the freestyle is discontinued so she was then prescribed Dexcom, this is currently under insurance review for approval.      Denies any s/s of peripheral neuropathy.     No CKD.    Lab Results   Component Value Date    CREATININE

## 2024-07-16 ENCOUNTER — OFFICE VISIT (OUTPATIENT)
Age: 54
End: 2024-07-16
Payer: COMMERCIAL

## 2024-07-16 VITALS
HEIGHT: 62 IN | OXYGEN SATURATION: 97 % | BODY MASS INDEX: 39.11 KG/M2 | SYSTOLIC BLOOD PRESSURE: 121 MMHG | HEART RATE: 78 BPM | TEMPERATURE: 97.8 F | WEIGHT: 212.5 LBS | RESPIRATION RATE: 18 BRPM | DIASTOLIC BLOOD PRESSURE: 79 MMHG

## 2024-07-16 DIAGNOSIS — K76.0 FATTY LIVER: ICD-10-CM

## 2024-07-16 DIAGNOSIS — S31.829S: ICD-10-CM

## 2024-07-16 DIAGNOSIS — E11.9 TYPE 2 DIABETES MELLITUS WITHOUT COMPLICATION, UNSPECIFIED WHETHER LONG TERM INSULIN USE (HCC): Primary | ICD-10-CM

## 2024-07-16 DIAGNOSIS — K82.8 GALLBLADDER SLUDGE: ICD-10-CM

## 2024-07-16 DIAGNOSIS — I25.10 CORONARY ARTERY DISEASE INVOLVING NATIVE CORONARY ARTERY OF NATIVE HEART WITHOUT ANGINA PECTORIS: ICD-10-CM

## 2024-07-16 LAB — HBA1C MFR BLD: 6.9 %

## 2024-07-16 PROCEDURE — 3046F HEMOGLOBIN A1C LEVEL >9.0%: CPT | Performed by: NURSE PRACTITIONER

## 2024-07-16 PROCEDURE — 99214 OFFICE O/P EST MOD 30 MIN: CPT | Performed by: NURSE PRACTITIONER

## 2024-07-16 PROCEDURE — 83036 HEMOGLOBIN GLYCOSYLATED A1C: CPT | Performed by: NURSE PRACTITIONER

## 2024-07-16 ASSESSMENT — PATIENT HEALTH QUESTIONNAIRE - PHQ9
SUM OF ALL RESPONSES TO PHQ QUESTIONS 1-9: 0
2. FEELING DOWN, DEPRESSED OR HOPELESS: NOT AT ALL
SUM OF ALL RESPONSES TO PHQ QUESTIONS 1-9: 0
1. LITTLE INTEREST OR PLEASURE IN DOING THINGS: NOT AT ALL
SUM OF ALL RESPONSES TO PHQ9 QUESTIONS 1 & 2: 0
SUM OF ALL RESPONSES TO PHQ QUESTIONS 1-9: 0
SUM OF ALL RESPONSES TO PHQ QUESTIONS 1-9: 0

## 2024-07-16 NOTE — PROGRESS NOTES
Labs drawn in left arm per Malgorzata's orders. Pt tolerated well.\"Have you been to the ER, urgent care clinic since your last visit?  Hospitalized since your last visit?\"    NO    “Have you seen or consulted any other health care providers outside of [facility] since your last visit?”    NO    Have you had a mammogram?”   NO will make apt    No breast cancer screening on file      “Have you had a pap smear?”    NO will make apt    No cervical cancer screening on file         “Have you had a colorectal cancer screening such as a colonoscopy/FIT/Cologuard?    NO    No colonoscopy on file  No cologuard on file  No FIT/FOBT on file   No flexible sigmoidoscopy on file         Click Here for Release of Records Request

## 2024-07-17 LAB
CHOLEST SERPL-MCNC: 147 MG/DL
COMMENT:: NORMAL
HDLC SERPL-MCNC: 45 MG/DL
HDLC SERPL: 3.3 (ref 0–5)
LDLC SERPL CALC-MCNC: 78.6 MG/DL (ref 0–100)
SPECIMEN HOLD: NORMAL
TRIGL SERPL-MCNC: 117 MG/DL
VLDLC SERPL CALC-MCNC: 23.4 MG/DL

## 2024-07-19 ENCOUNTER — HOSPITAL ENCOUNTER (OUTPATIENT)
Facility: HOSPITAL | Age: 54
Discharge: HOME | End: 2024-07-19

## 2024-07-19 DIAGNOSIS — I25.10 CORONARY ARTERY DISEASE INVOLVING NATIVE CORONARY ARTERY OF NATIVE HEART WITHOUT ANGINA PECTORIS: ICD-10-CM

## 2024-07-19 PROCEDURE — 75571 CT HRT W/O DYE W/CA TEST: CPT

## 2024-07-22 DIAGNOSIS — Z12.31 ENCOUNTER FOR SCREENING MAMMOGRAM FOR MALIGNANT NEOPLASM OF BREAST: Primary | ICD-10-CM

## 2024-07-30 DIAGNOSIS — I25.10 CORONARY ARTERY DISEASE INVOLVING NATIVE CORONARY ARTERY OF NATIVE HEART WITHOUT ANGINA PECTORIS: Primary | ICD-10-CM

## 2024-07-30 RX ORDER — ROSUVASTATIN CALCIUM 5 MG/1
5 TABLET, COATED ORAL NIGHTLY
Qty: 90 TABLET | Refills: 0 | Status: SHIPPED | OUTPATIENT
Start: 2024-07-30

## 2024-07-30 RX ORDER — ASPIRIN 81 MG/1
81 TABLET ORAL DAILY
Qty: 90 TABLET | Refills: 0 | Status: SHIPPED | OUTPATIENT
Start: 2024-07-30

## 2024-07-31 ENCOUNTER — TELEPHONE (OUTPATIENT)
Age: 54
End: 2024-07-31

## 2024-07-31 NOTE — TELEPHONE ENCOUNTER
Patient states she would like to establish care with , she says she recently got a CT calcium test and the results were abnormal.    Phone [phone]

## 2024-08-02 ENCOUNTER — TELEPHONE (OUTPATIENT)
Age: 54
End: 2024-08-02

## 2024-08-02 NOTE — TELEPHONE ENCOUNTER
Patient called in stating that she seen on website that  is accepting new pation . I informed her that I know that he is not taking new patients at the [facility] location but patient is wanting to be seen at either [facility] or [facility]. Patient is wanting to please have an call back to know if he is taking new patient appointments.         Patient #~ [phone]

## 2024-08-06 ENCOUNTER — HOSPITAL ENCOUNTER (OUTPATIENT)
Facility: HOSPITAL | Age: 54
Discharge: HOME | End: 2024-08-09
Payer: COMMERCIAL

## 2024-08-06 DIAGNOSIS — Z12.31 ENCOUNTER FOR SCREENING MAMMOGRAM FOR MALIGNANT NEOPLASM OF BREAST: ICD-10-CM

## 2024-08-06 PROCEDURE — 77063 BREAST TOMOSYNTHESIS BI: CPT

## 2024-08-19 ENCOUNTER — OFFICE VISIT (OUTPATIENT)
Age: 54
End: 2024-08-19
Payer: COMMERCIAL

## 2024-08-19 VITALS
HEART RATE: 70 BPM | DIASTOLIC BLOOD PRESSURE: 90 MMHG | HEIGHT: 62 IN | WEIGHT: 219 LBS | SYSTOLIC BLOOD PRESSURE: 156 MMHG | RESPIRATION RATE: 16 BRPM | OXYGEN SATURATION: 97 % | BODY MASS INDEX: 40.3 KG/M2

## 2024-08-19 DIAGNOSIS — I25.10 CORONARY ARTERY DISEASE DUE TO LIPID RICH PLAQUE: ICD-10-CM

## 2024-08-19 DIAGNOSIS — I25.83 CORONARY ARTERY DISEASE DUE TO LIPID RICH PLAQUE: ICD-10-CM

## 2024-08-19 DIAGNOSIS — Z76.89 ESTABLISHING CARE WITH NEW DOCTOR, ENCOUNTER FOR: Primary | ICD-10-CM

## 2024-08-19 DIAGNOSIS — E11.9 TYPE 2 DIABETES MELLITUS WITHOUT COMPLICATION, WITHOUT LONG-TERM CURRENT USE OF INSULIN (HCC): ICD-10-CM

## 2024-08-19 DIAGNOSIS — E78.2 MIXED HYPERLIPIDEMIA: ICD-10-CM

## 2024-08-19 PROCEDURE — 93000 ELECTROCARDIOGRAM COMPLETE: CPT | Performed by: INTERNAL MEDICINE

## 2024-08-19 PROCEDURE — 3046F HEMOGLOBIN A1C LEVEL >9.0%: CPT | Performed by: INTERNAL MEDICINE

## 2024-08-19 PROCEDURE — 99204 OFFICE O/P NEW MOD 45 MIN: CPT | Performed by: INTERNAL MEDICINE

## 2024-08-19 RX ORDER — EZETIMIBE 10 MG/1
10 TABLET ORAL DAILY
Qty: 30 TABLET | Refills: 3 | Status: SHIPPED | OUTPATIENT
Start: 2024-08-19

## 2024-08-19 RX ORDER — PRAVASTATIN SODIUM 40 MG
40 TABLET ORAL DAILY
Qty: 90 TABLET | Refills: 1 | Status: SHIPPED | OUTPATIENT
Start: 2024-08-19

## 2024-08-19 ASSESSMENT — PATIENT HEALTH QUESTIONNAIRE - PHQ9
1. LITTLE INTEREST OR PLEASURE IN DOING THINGS: NOT AT ALL
2. FEELING DOWN, DEPRESSED OR HOPELESS: NOT AT ALL
SUM OF ALL RESPONSES TO PHQ QUESTIONS 1-9: 0
SUM OF ALL RESPONSES TO PHQ QUESTIONS 1-9: 0
SUM OF ALL RESPONSES TO PHQ9 QUESTIONS 1 & 2: 0
SUM OF ALL RESPONSES TO PHQ QUESTIONS 1-9: 0
SUM OF ALL RESPONSES TO PHQ QUESTIONS 1-9: 0

## 2024-08-19 NOTE — PROGRESS NOTES
[place] [address]  Fax : [phone]     [facility] -- Cardiology, [place]  [address]  [address]  [place] [address]  Fax: [phone]    [facility]  Call center: (P) [phone]  (F) [phone]    The patient (or guardian, if applicable) and other individuals in attendance with the patient were advised that Artificial Intelligence will be utilized during this visit to record and process the conversation to generate a clinical note. The patient (or guardian, if applicable) and other individuals in attendance at the appointment consented to the use of AI, including the recording.       Voice - recognition dictation software was used in  the generation of this note.  Errors may exist. if there is any potential confusion or discrepancy, please feel free to contact me for clarification

## 2024-08-19 NOTE — PATIENT INSTRUCTIONS
You will be scheduled for an exercise stress test after your appointment today.  Please wear comfortable clothing (shorts or pants with a shirt or blouse) and walking/athletic shoes.  Do not eat or drink anything, except water, for at least 2 hours prior to your test.  Do take your scheduled medications prior to your test.      Please have blood work done in a couple of weeks!    We have sent a referral in to Dr. Vi Grigsby, with [facility]. They will call you to schedule an appointment.

## 2024-09-04 ENCOUNTER — OFFICE VISIT (OUTPATIENT)
Age: 54
End: 2024-09-04

## 2024-09-04 VITALS
HEART RATE: 81 BPM | OXYGEN SATURATION: 99 % | RESPIRATION RATE: 16 BRPM | SYSTOLIC BLOOD PRESSURE: 116 MMHG | BODY MASS INDEX: 39.88 KG/M2 | DIASTOLIC BLOOD PRESSURE: 80 MMHG | WEIGHT: 216.7 LBS | HEIGHT: 62 IN

## 2024-09-04 DIAGNOSIS — E11.9 TYPE 2 DIABETES MELLITUS WITHOUT COMPLICATION, WITHOUT LONG-TERM CURRENT USE OF INSULIN (HCC): Primary | ICD-10-CM

## 2024-09-04 RX ORDER — FAMOTIDINE 40 MG/1
40 TABLET, FILM COATED ORAL NIGHTLY PRN
Qty: 30 TABLET | Refills: 3 | Status: SHIPPED | OUTPATIENT
Start: 2024-09-04

## 2024-09-04 RX ORDER — TIRZEPATIDE 2.5 MG/.5ML
2.5 INJECTION, SOLUTION SUBCUTANEOUS WEEKLY
Qty: 2 ML | Refills: 3 | Status: SHIPPED | OUTPATIENT
Start: 2024-09-04

## 2024-09-04 RX ORDER — TIRZEPATIDE 7.5 MG/.5ML
7.5 INJECTION, SOLUTION SUBCUTANEOUS WEEKLY
Qty: 2 ML | Refills: 3 | Status: SHIPPED | OUTPATIENT
Start: 2024-09-04

## 2024-09-04 RX ORDER — TIRZEPATIDE 5 MG/.5ML
5 INJECTION, SOLUTION SUBCUTANEOUS WEEKLY
Qty: 2 ML | Refills: 3 | Status: SHIPPED | OUTPATIENT
Start: 2024-09-04

## 2024-09-04 RX ORDER — TIRZEPATIDE 10 MG/.5ML
10 INJECTION, SOLUTION SUBCUTANEOUS WEEKLY
Qty: 2 ML | Refills: 3 | Status: SHIPPED | OUTPATIENT
Start: 2024-09-04

## 2024-09-04 RX ORDER — TIRZEPATIDE 15 MG/.5ML
15 INJECTION, SOLUTION SUBCUTANEOUS WEEKLY
Qty: 2 ML | Refills: 5 | Status: SHIPPED | OUTPATIENT
Start: 2024-09-04

## 2024-09-04 RX ORDER — TIRZEPATIDE 12.5 MG/.5ML
12.5 INJECTION, SOLUTION SUBCUTANEOUS WEEKLY
Qty: 2 ML | Refills: 3 | Status: SHIPPED | OUTPATIENT
Start: 2024-09-04

## 2024-09-04 RX ORDER — ONDANSETRON 4 MG/1
4 TABLET, FILM COATED ORAL DAILY PRN
Qty: 30 TABLET | Refills: 0 | Status: SHIPPED | OUTPATIENT
Start: 2024-09-04

## 2024-09-04 NOTE — PROGRESS NOTES
Chief Complaint   Patient presents with    New Patient    Diabetes       History of Present Illness: Sonal Cottrell is a 54 y.o. female with a past medical hx significant for PCOS, ASCVD presenting in referral from Malgorzata Mac APRN - [facility] for discussion related to medication management of diabetes mellitus.     Intolerant to metformin and ozempic. Strong family hx of type 2 diabetes. Extensive ASCVD, hesitant to take medications if she doesn't need to. Previously required 4x daily insulin dosing.    Diabetes Mellitus Type  II   * Diagnosed (year): 2024   * Last Hb A1C: 12.7 4/3/2024     - Current DM medications:    - Orals:    - Injectables:     - Monitors glucose: epifanio 3    - History of hypoglycemia:     - Hospitalized for DM:     Diabetes-related complications:    * Nephropathy:    * Retinopathy:    * Neuropathy:    * Autonomic dysfunction:    * History of amputations or foot ulcers:      Lifestyle:    24-hour diet history:    meals/day snacks/day   * Breakfast:    * Lunch:    * Dinner:    * Snacks:     * Desserts:    * Drinks:    2019QI  Exercise:       Support and Resources:   - Visit with dietician in the last 2 years:     Past Medical History:   Diagnosis Date    Diabetes mellitus (HCC)     Hepatic steatosis     History of ITP     Hypoalbuminemia     Morbid obesity (HCC)        Past Surgical History:   Procedure Laterality Date    ABSCESS DRAINAGE Left 4/4/2024    LEFT GLUTEAL WOUND SHARP DEBRIDEMENT SKIN SUBCUTANEOUS TISSUE DRAINAIGE OF ABSCESS COMPLICATED performed by Sanjay Gabriel MD at [facility] MAIN OR    CARPAL TUNNEL RELEASE Bilateral 2005       Current Outpatient Medications   Medication Sig    ezetimibe (ZETIA) 10 MG tablet Take 1 tablet by mouth daily (Patient taking differently: Take 1 tablet by mouth daily PT HAS NOT STARTED THIS RX.)    aspirin 81 MG EC tablet Take 1 tablet by mouth daily    nystatin (MYCOSTATIN) 962354 UNIT/GM cream Apply 1 Application topically as needed (Yeast infection )

## 2024-09-09 ENCOUNTER — PATIENT MESSAGE (OUTPATIENT)
Age: 54
End: 2024-09-09

## 2024-09-09 DIAGNOSIS — Z12.11 SCREENING FOR COLON CANCER: Primary | ICD-10-CM

## 2024-09-09 RX ORDER — FAMOTIDINE 40 MG/1
40 TABLET, FILM COATED ORAL NIGHTLY PRN
Qty: 90 TABLET | Refills: 1 | OUTPATIENT
Start: 2024-09-09

## 2024-10-16 ENCOUNTER — OFFICE VISIT (OUTPATIENT)
Age: 54
End: 2024-10-16
Payer: COMMERCIAL

## 2024-10-16 VITALS
HEIGHT: 62 IN | DIASTOLIC BLOOD PRESSURE: 78 MMHG | RESPIRATION RATE: 18 BRPM | SYSTOLIC BLOOD PRESSURE: 125 MMHG | TEMPERATURE: 97 F | OXYGEN SATURATION: 98 % | BODY MASS INDEX: 41.41 KG/M2 | HEART RATE: 98 BPM | WEIGHT: 225 LBS

## 2024-10-16 DIAGNOSIS — K59.00 CONSTIPATION, UNSPECIFIED CONSTIPATION TYPE: ICD-10-CM

## 2024-10-16 DIAGNOSIS — E11.65 TYPE 2 DIABETES MELLITUS WITH HYPERGLYCEMIA, WITHOUT LONG-TERM CURRENT USE OF INSULIN (HCC): Primary | ICD-10-CM

## 2024-10-16 DIAGNOSIS — I25.10 CORONARY ARTERY DISEASE INVOLVING NATIVE CORONARY ARTERY OF NATIVE HEART WITHOUT ANGINA PECTORIS: ICD-10-CM

## 2024-10-16 LAB — HBA1C MFR BLD: 7.8 %

## 2024-10-16 PROCEDURE — 99215 OFFICE O/P EST HI 40 MIN: CPT | Performed by: NURSE PRACTITIONER

## 2024-10-16 PROCEDURE — 3046F HEMOGLOBIN A1C LEVEL >9.0%: CPT | Performed by: NURSE PRACTITIONER

## 2024-10-16 PROCEDURE — 83036 HEMOGLOBIN GLYCOSYLATED A1C: CPT | Performed by: NURSE PRACTITIONER

## 2024-10-16 RX ORDER — SEMAGLUTIDE 0.68 MG/ML
0.25 INJECTION, SOLUTION SUBCUTANEOUS
Qty: 2 ML | Refills: 0
Start: 2024-10-16

## 2024-10-16 RX ORDER — ATORVASTATIN CALCIUM 20 MG/1
20 TABLET, FILM COATED ORAL DAILY
Qty: 90 TABLET | Refills: 0 | Status: SHIPPED | OUTPATIENT
Start: 2024-10-16

## 2024-10-16 NOTE — PROGRESS NOTES
Subjective:     CC: diabetes, CAD    Sonal Cottrell is a 54 y.o. female who presents today for a 3 month follow up for type 2 diabetes and CAD.     She and her  moved back here from [place] in the spring, at which time she established care here.    Type 2 diabetes   Dx'd in [place] in April of 2-2024. A1C was 12.7.   She was being treated for an abscess.   She was started on Lantus isulin 50 untis daily with Humulin 10 units q meal TID.  She made a lot of changes to her diet, lost some weight, and was able to come off the insulin and transition to Ozempic. She took the low dose for a while and then stopped it when she felt she could manage the sugars on her own with her diet.     Next A1C was 6.9 in 7-2024.    She then saw a cardiologist for CAD who referred her to endocrinologist Dr Grigsby.   Per patient, Dr Grigsby recommended Mounjaro due to its low side effect profile. Patient never picked up the medication, however, because she does not want to have to take any medication for her diabetes.     Since she quit smoking she admits she has been eating more, in particular more bread. Today her POC A1C is elevated at 7.8.  She said she would consider starting the Mounjaro however her insurance is about to change in January and Dr. Grigsby told her that her new insurance would likely not cover the Mounjaro.  She is willing to restart the Ozempic because she still has some of this at home and tolerated it when she was taking it a few months ago.  She was also encouraged to try to cut back on bread and other carbs.     Of note, she could not tolerate Metformin in the past, tried this years ago when she was dx'd with PCOS.      Denies any s/s of peripheral neuropathy.     No CKD.      CAD  Cardiac calcium CT scan was done 7-2024.   It showed a very high score of 643 (extensive CAD).  She was referred to cardiologist Dr Crowder.   Stress test was done 9-4-24- normal.   ECHO done 10-9-2024, normal.   He advised her to start a baby

## 2024-10-16 NOTE — PROGRESS NOTES
\"Have you been to the ER, urgent care clinic since your last visit?  Hospitalized since your last visit?\"    NO    “Have you seen or consulted any other health care providers outside our system since your last visit?”    NO     “Have you had a pap smear?”    NO    No cervical cancer screening on file       “Have you had a colorectal cancer screening such as a colonoscopy/FIT/Cologuard?    NO    No colonoscopy on file  No cologuard on file  No FIT/FOBT on file   No flexible sigmoidoscopy on file     “Have you had a diabetic eye exam?”    NO     No diabetic eye exam on file

## 2024-10-22 ENCOUNTER — OFFICE VISIT (OUTPATIENT)
Age: 54
End: 2024-10-22

## 2024-10-22 VITALS
TEMPERATURE: 97.8 F | RESPIRATION RATE: 12 BRPM | SYSTOLIC BLOOD PRESSURE: 130 MMHG | HEIGHT: 62 IN | OXYGEN SATURATION: 98 % | HEART RATE: 74 BPM | DIASTOLIC BLOOD PRESSURE: 84 MMHG | WEIGHT: 226 LBS | BODY MASS INDEX: 41.59 KG/M2

## 2024-10-22 DIAGNOSIS — Z12.11 COLON CANCER SCREENING: Primary | ICD-10-CM

## 2024-10-22 ASSESSMENT — PATIENT HEALTH QUESTIONNAIRE - PHQ9
2. FEELING DOWN, DEPRESSED OR HOPELESS: NOT AT ALL
SUM OF ALL RESPONSES TO PHQ QUESTIONS 1-9: 0
1. LITTLE INTEREST OR PLEASURE IN DOING THINGS: NOT AT ALL
SUM OF ALL RESPONSES TO PHQ9 QUESTIONS 1 & 2: 0
SUM OF ALL RESPONSES TO PHQ QUESTIONS 1-9: 0

## 2024-10-22 NOTE — PROGRESS NOTES
Sonal Cottrell is a 54 y.o. female who presents today with the following:  Chief Complaint   Patient presents with    New Patient    Colonoscopy       HPI    54-year-old female who presents as a referral by Malgorzata Mac for initial screening colonoscopy.  She has had no prior colon evaluation.  She has no family history of colon cancer.  She denies any personal history of melena or hematochezia or diarrhea or constipation.  She denies any abdominal pain or unexpected weight loss or change in the caliber of her stool.  She denies any recent stool testing.    In the past she has had a history of ITP which she states is in remission and she is not currently being followed for this.  She recently was diagnosed as a diabetic and has been restarted on Ozempic.    She has had bilateral carpal tunnel release in April of this year she developed a left buttock abscess that she states was complicated with staph infection and required an extensive debridement but the area has completely healed.  This is at the time when she was diagnosed with diabetes.    She does take an 81 mg aspirin a day and occasionally takes Naprosyn for pain.    She stopped smoking in January of this year prior to which she has had a 30-pack-year history.  Past Medical History:   Diagnosis Date    Diabetes mellitus (HCC)     Hepatic steatosis     History of ITP     Hypoalbuminemia     Morbid obesity        Past Surgical History:   Procedure Laterality Date    ABSCESS DRAINAGE Left 4/4/2024    LEFT GLUTEAL WOUND SHARP DEBRIDEMENT SKIN SUBCUTANEOUS TISSUE DRAINAIGE OF ABSCESS COMPLICATED performed by Sanjay Gabriel MD at [facility] MAIN OR    CARPAL TUNNEL RELEASE Bilateral 2005       Social History     Socioeconomic History    Marital status:      Spouse name: Not on file    Number of children: Not on file    Years of education: Not on file    Highest education level: Not on file   Occupational History    Not on file   Tobacco Use    Smoking status:

## 2024-10-22 NOTE — PROGRESS NOTES
Identified pt with two pt identifiers (name and ). Reviewed chart in preparation for visit and have obtained necessary documentation.    Sonal Cottrell is a 54 y.o. female New Patient and Colonoscopy  .    Vitals:    10/22/24 0926 10/22/24 0927   BP: (!) 144/86 130/84   Site: Right Upper Arm    Position: Sitting    Cuff Size: Large Adult    Pulse: 74    Resp: 12    Temp: 97.8 °F (36.6 °C)    TempSrc: Oral    SpO2: 98%    Weight: 102.5 kg (226 lb)    Height: 1.575 m (5' 2\")           1. Have you been to the ER, urgent care clinic since your last visit?  Hospitalized since your last visit?  no     2. Have you seen or consulted any other health care providers outside of the [facility] since your last visit?  Include any pap smears or colon screening.  no

## 2024-10-22 NOTE — PATIENT INSTRUCTIONS
When should you call your doctor?   You have questions or concerns.     You don't understand how to prepare for your procedure.     You are having trouble with the bowel prep.     You become ill before the procedure (such as fever, flu, or a cold).     You need to reschedule or have changed your mind about having the procedure.   Where can you learn more?  Go to https://www.[employer].net/patientEd and enter C315 to learn more about \"Colonoscopy: Before Your Procedure.\"  Current as of: May 4, 2022               Content Version: 13.6  © 5368-7980 [employer].   Care instructions adapted under license by [employer]. If you have questions about a medical condition or this instruction, always ask your healthcare professional. [employer] disclaims any warranty or liability for your use of this information.

## 2024-10-23 ENCOUNTER — PREP FOR PROCEDURE (OUTPATIENT)
Age: 54
End: 2024-10-23

## 2024-10-23 DIAGNOSIS — Z12.11 COLON CANCER SCREENING: ICD-10-CM

## 2024-10-23 RX ORDER — SEMAGLUTIDE 0.68 MG/ML
0.25 INJECTION, SOLUTION SUBCUTANEOUS
Qty: 2 ML | Refills: 0 | OUTPATIENT
Start: 2024-10-23

## 2024-10-25 ENCOUNTER — ANESTHESIA EVENT (OUTPATIENT)
Facility: HOSPITAL | Age: 54
End: 2024-10-25
Payer: COMMERCIAL

## 2024-10-25 NOTE — ANESTHESIA PRE PROCEDURE
Department of Anesthesiology  Preprocedure Note       Name:  Sonal Cottrell   Age:  54 y.o.  :  1970                                          MRN:  [id number]         Date:  10/25/2024      Surgeon: Surgeon(s):  Gael Lebron MD    Procedure: Procedure(s):  COLONOSCOPY    Medications prior to admission:   Prior to Admission medications    Medication Sig Start Date End Date Taking? Authorizing Provider   atorvastatin (LIPITOR) 20 MG tablet Take 1 tablet by mouth daily 10/16/24   Malgorzata Mac APRN - NP   Semaglutide,0.25 or 0.5MG/DOS, (OZEMPIC, 0.25 OR 0.5 MG/DOSE,) 2 MG/3ML SOPN Inject 0.25 mg into the skin every 7 days 10/16/24   Malgorzata Mac APRN - NP   aspirin 81 MG EC tablet Take 1 tablet by mouth daily  Patient not taking: Reported on 10/16/2024 7/30/24   Malgorzata Mac APRN - NP       Current medications:    No current facility-administered medications for this encounter.     Current Outpatient Medications   Medication Sig Dispense Refill   • atorvastatin (LIPITOR) 20 MG tablet Take 1 tablet by mouth daily 90 tablet 0   • Semaglutide,0.25 or 0.5MG/DOS, (OZEMPIC, 0.25 OR 0.5 MG/DOSE,) 2 MG/3ML SOPN Inject 0.25 mg into the skin every 7 days 2 mL 0   • aspirin 81 MG EC tablet Take 1 tablet by mouth daily (Patient not taking: Reported on 10/16/2024) 90 tablet 0       Allergies:    Allergies   Allergen Reactions   • Sulfa Antibiotics Hives       Problem List:    Patient Active Problem List   Diagnosis Code   • Hyperglycemia R73.9   • Diabetes mellitus (HCC) E11.9   • Hepatic steatosis K76.0   • History of ITP Z86.2   • Morbid obesity E66.01   • Hypoalbuminemia E88.09   • Gluteal cleft wound S31.809A   • Cellulitis and abscess of leg L03.119, L02.419   • PCOS (polycystic ovarian syndrome) E28.2   • Gallbladder sludge K82.8   • Coronary artery disease involving native coronary artery of native heart without angina pectoris I25.10   • Obesity (BMI 30-39.9) E66.9   • Colon cancer screening Z12.11

## 2024-10-27 RX ORDER — ASPIRIN 81 MG/1
81 TABLET, COATED ORAL DAILY
Qty: 90 TABLET | Refills: 3 | Status: SHIPPED | OUTPATIENT
Start: 2024-10-27

## 2024-11-19 ENCOUNTER — TELEMEDICINE (OUTPATIENT)
Age: 54
End: 2024-11-19
Payer: COMMERCIAL

## 2024-11-19 DIAGNOSIS — E11.65 TYPE 2 DIABETES MELLITUS WITH HYPERGLYCEMIA, WITHOUT LONG-TERM CURRENT USE OF INSULIN (HCC): Primary | ICD-10-CM

## 2024-11-19 DIAGNOSIS — I25.10 CORONARY ARTERY DISEASE INVOLVING NATIVE CORONARY ARTERY OF NATIVE HEART WITHOUT ANGINA PECTORIS: ICD-10-CM

## 2024-11-19 PROCEDURE — 99213 OFFICE O/P EST LOW 20 MIN: CPT | Performed by: NURSE PRACTITIONER

## 2024-11-19 PROCEDURE — 3046F HEMOGLOBIN A1C LEVEL >9.0%: CPT | Performed by: NURSE PRACTITIONER

## 2024-11-19 ASSESSMENT — PATIENT HEALTH QUESTIONNAIRE - PHQ9
SUM OF ALL RESPONSES TO PHQ9 QUESTIONS 1 & 2: 0
SUM OF ALL RESPONSES TO PHQ QUESTIONS 1-9: 0
SUM OF ALL RESPONSES TO PHQ QUESTIONS 1-9: 0
2. FEELING DOWN, DEPRESSED OR HOPELESS: NOT AT ALL
1. LITTLE INTEREST OR PLEASURE IN DOING THINGS: NOT AT ALL
SUM OF ALL RESPONSES TO PHQ QUESTIONS 1-9: 0
SUM OF ALL RESPONSES TO PHQ QUESTIONS 1-9: 0

## 2024-11-19 NOTE — PROGRESS NOTES
\"Have you been to the ER, urgent care clinic since your last visit?  Hospitalized since your last visit?\"    NO    “Have you seen or consulted any other health care providers outside our system since your last visit?”    YES - When: approximately 1 months ago.  Where and Why: Dr Lebron.     “Have you had a pap smear?”    NO has apt in DEC    No cervical cancer screening on file       “Have you had a colorectal cancer screening such as a colonoscopy/FIT/Cologuard?    NO   Has apt coming up   No colonoscopy on file  No cologuard on file  No FIT/FOBT on file   No flexible sigmoidoscopy on file     “Have you had a diabetic eye exam?”    NO     No diabetic eye exam on file

## 2024-11-19 NOTE — PROGRESS NOTES
2024    TELEHEALTH EVALUATION -- Audio/Visual    HPI:    Sonal Cottrell (:  1970) has requested an audio/video evaluation for the following concern(s):    CC: diabetes, CAD    Sonal Cottrell is a 54 y.o. female who presents today for a 1 month follow up for type 2 diabetes and CAD.   She and her  moved back here from [place] in the spring, at which time she established care here.    Type 2 diabetes   Dx'd in [place] in . A1C was 12.7.   She was being treated for an abscess.   She was started on Lantus isulin 50 untis daily with Humulin 10 units q meal TID.  She made a lot of changes to her diet, lost some weight, and was able to come off the insulin and transition to Ozempic. She took the low dose for a while and then stopped it when she felt she could manage the sugars on her own with her diet.     Next A1C was 6.9 in .    After she quit smoking, however, she started eating more, in particular more bread. Her most recent A1C was elevated at 7.8 on 10-16-24.      She was restarted on Ozempic but felt so nauseas after the first dose that she did not attempt a second dose. Would like to try something else.      She could not tolerate Metformin in the past, tried this years ago when she was dx'd with PCOS.     She is allergic to sulfa which rules out glipizide or glimeperide.     Will try Jardiance. (A low dose per her request).     Denies any s/s of peripheral neuropathy.     No CKD.      CAD  Cardiac calcium CT scan was done .   It showed a very high score of 643 (extensive CAD).  She was referred to cardiologist Dr Crowder.   Stress test was done 24- normal.   ECHO done 10-9-2024, normal.   He advised her to start a baby aspirin daily along with Crestor. Unfortunately she became very constipated so she quit taking both.  Pravastatin was then prescribed but she could not tolerate this either.  Zetia was then prescribed but she is not taking it.  She does not want to have to take

## 2024-11-22 PROBLEM — Z12.11 COLON CANCER SCREENING: Status: RESOLVED | Noted: 2024-10-23 | Resolved: 2024-11-22

## 2024-12-02 ASSESSMENT — PAIN - FUNCTIONAL ASSESSMENT: PAIN_FUNCTIONAL_ASSESSMENT: NONE - DENIES PAIN

## 2024-12-02 NOTE — PROGRESS NOTES
[facility]  SURGICAL PRE-ADMISSION INSTRUCTIONS    ARRIVAL  You will be called the day before your surgery with your expected arrival time.  Sign in at the  of the hospital.  You will be directed to the Surgical Waiting Room.  Please arrive at your scheduled appointment time.  You have been scheduled to arrive for your procedure one or two hours prior to the expected start time of your procedure.  Every effort will be made to minimize your wait but please be aware that unforeseen circumstances may affect our schedule.    EATING  DO NOT EAT OR DRINK ANYTHING AFTER MIDNIGHT ON THE EVENING BEFORE YOUR SURGERY OR ON THE DAY OF YOUR SURGERY except for your medications (as instructed) with a sip of water.  Do not use gum, mints or lozenges on the morning of your surgery.  Please do not smoke or chew tobacco before your surgery.    MEDICATIONS   Take the following medications on the morning of your surgery with the smallest amount of water possible : none    STOP THESE MEDICATIONS AT THE TIMES LISTED BELOW  NSAIDS (Indocin, Ibuprofen, Naprosyn) ;  7 days before     DRIVING/TRANSPORATION  Have a responsible adult to drive you home from the hospital and to stay with you over night.  Please have them plan to remain in the hospital during your surgery.  Your surgery will not be done if you do not have a responsible adult to take you home and to stay with you.  If you have arranged for public transport, you must have a responsible adult to ride with you (who is not the ).  You may not drive for 24 hours after anesthesia.    PREPARATION  If you have a Living WiIl/Advance Directive, please bring a copy with you to scan into your chart.   Please DO NOT wear makeup or nail polish  Please leave valuables at home,  DO NOT wear jewelry.  Wear loose, comfortable clothing that is large enough to cover a bulky dressing.    SPECIAL INSTRUCTIONS:  Follow your surgeon's instructions for

## 2024-12-05 PROBLEM — Z12.11 COLON CANCER SCREENING: Status: ACTIVE | Noted: 2024-10-23

## 2024-12-06 RX ORDER — SODIUM CHLORIDE 9 MG/ML
INJECTION, SOLUTION INTRAVENOUS PRN
Status: CANCELLED | OUTPATIENT
Start: 2024-12-06

## 2024-12-06 RX ORDER — SODIUM CHLORIDE, SODIUM LACTATE, POTASSIUM CHLORIDE, CALCIUM CHLORIDE 600; 310; 30; 20 MG/100ML; MG/100ML; MG/100ML; MG/100ML
INJECTION, SOLUTION INTRAVENOUS CONTINUOUS
Status: CANCELLED | OUTPATIENT
Start: 2024-12-06

## 2024-12-06 RX ORDER — SODIUM CHLORIDE 0.9 % (FLUSH) 0.9 %
5-40 SYRINGE (ML) INJECTION EVERY 12 HOURS SCHEDULED
Status: CANCELLED | OUTPATIENT
Start: 2024-12-06

## 2024-12-06 RX ORDER — SODIUM CHLORIDE 0.9 % (FLUSH) 0.9 %
5-40 SYRINGE (ML) INJECTION PRN
Status: CANCELLED | OUTPATIENT
Start: 2024-12-06

## 2024-12-06 NOTE — H&P (VIEW-ONLY)
BY MOUTH EVERY DAY (Patient not taking: Reported on 11/19/2024) 90 tablet 3    atorvastatin (LIPITOR) 20 MG tablet Take 1 tablet by mouth daily (Patient not taking: Reported on 11/19/2024) 90 tablet 0     No current facility-administered medications for this visit.       The above histories, medications and allergies have been reviewed.    Review of Systems      There were no vitals taken for this visit.  Physical Exam  Constitutional:       Appearance: Normal appearance.   Cardiovascular:      Rate and Rhythm: Normal rate and regular rhythm.   Pulmonary:      Effort: No respiratory distress.      Breath sounds: Normal breath sounds. No wheezing.   Abdominal:      General: There is no distension.      Palpations: Abdomen is soft. There is no mass.      Tenderness: There is no abdominal tenderness.   Neurological:      Mental Status: She is alert.          1. Colon cancer screening  Recommend colonoscopy.  The procedure was explained in detail including the risks and benefits.  Risks shared included risks of missed lesions, incomplete exam, colon injury or perforation.   Risks associated with anesthesia were also discussed.  The patient wishes to proceed and we will schedule.      No follow-ups on file.     Gael Lebron MD

## 2024-12-09 ENCOUNTER — HOSPITAL ENCOUNTER (OUTPATIENT)
Facility: HOSPITAL | Age: 54
Setting detail: OUTPATIENT SURGERY
Discharge: HOME | End: 2024-12-09
Attending: SURGERY | Admitting: SURGERY
Payer: COMMERCIAL

## 2024-12-09 ENCOUNTER — HOSPITAL ENCOUNTER (EMERGENCY)
Facility: HOSPITAL | Age: 54
Discharge: HOME | End: 2024-12-09
Attending: EMERGENCY MEDICINE
Payer: COMMERCIAL

## 2024-12-09 ENCOUNTER — ANESTHESIA (OUTPATIENT)
Facility: HOSPITAL | Age: 54
End: 2024-12-09
Payer: COMMERCIAL

## 2024-12-09 ENCOUNTER — APPOINTMENT (OUTPATIENT)
Facility: HOSPITAL | Age: 54
End: 2024-12-09
Payer: COMMERCIAL

## 2024-12-09 VITALS
SYSTOLIC BLOOD PRESSURE: 141 MMHG | OXYGEN SATURATION: 99 % | HEIGHT: 62 IN | HEART RATE: 80 BPM | BODY MASS INDEX: 40.12 KG/M2 | RESPIRATION RATE: 17 BRPM | TEMPERATURE: 97.6 F | WEIGHT: 218 LBS | DIASTOLIC BLOOD PRESSURE: 76 MMHG

## 2024-12-09 VITALS
HEART RATE: 60 BPM | OXYGEN SATURATION: 96 % | HEIGHT: 62 IN | DIASTOLIC BLOOD PRESSURE: 67 MMHG | WEIGHT: 219 LBS | RESPIRATION RATE: 11 BRPM | TEMPERATURE: 98.7 F | SYSTOLIC BLOOD PRESSURE: 122 MMHG | BODY MASS INDEX: 40.3 KG/M2

## 2024-12-09 DIAGNOSIS — R07.89 ATYPICAL CHEST PAIN: Primary | ICD-10-CM

## 2024-12-09 LAB
ALBUMIN SERPL-MCNC: 4.1 G/DL (ref 3.5–5)
ALBUMIN/GLOB SERPL: 1.2 (ref 1.1–2.2)
ALP SERPL-CCNC: 76 U/L (ref 45–117)
ALT SERPL-CCNC: 34 U/L (ref 12–78)
ANION GAP SERPL CALC-SCNC: 11 MMOL/L (ref 2–12)
AST SERPL-CCNC: 22 U/L (ref 15–37)
BASOPHILS # BLD: 0.1 K/UL (ref 0–0.1)
BASOPHILS NFR BLD: 1 % (ref 0–1)
BILIRUB SERPL-MCNC: 0.7 MG/DL (ref 0.2–1)
BUN SERPL-MCNC: 17 MG/DL (ref 6–20)
BUN/CREAT SERPL: 17 (ref 12–20)
CALCIUM SERPL-MCNC: 9.9 MG/DL (ref 8.5–10.1)
CHLORIDE SERPL-SCNC: 102 MMOL/L (ref 97–108)
CO2 SERPL-SCNC: 26 MMOL/L (ref 21–32)
CREAT SERPL-MCNC: 0.98 MG/DL (ref 0.55–1.02)
DIFFERENTIAL METHOD BLD: ABNORMAL
EOSINOPHIL # BLD: 0.1 K/UL (ref 0–0.4)
EOSINOPHIL NFR BLD: 1 % (ref 0–7)
ERYTHROCYTE [DISTWIDTH] IN BLOOD BY AUTOMATED COUNT: 11.9 % (ref 11.5–14.5)
GLOBULIN SER CALC-MCNC: 3.3 G/DL (ref 2–4)
GLUCOSE BLD STRIP.AUTO-MCNC: 187 MG/DL (ref 65–117)
GLUCOSE SERPL-MCNC: 258 MG/DL (ref 65–100)
HCT VFR BLD AUTO: 44.9 % (ref 35–47)
HGB BLD-MCNC: 16.2 G/DL (ref 11.5–16)
IMM GRANULOCYTES # BLD AUTO: 0 K/UL (ref 0–0.04)
IMM GRANULOCYTES NFR BLD AUTO: 0 % (ref 0–0.5)
LIPASE SERPL-CCNC: 55 U/L (ref 13–75)
LYMPHOCYTES # BLD: 1.5 K/UL (ref 0.8–3.5)
LYMPHOCYTES NFR BLD: 14 % (ref 12–49)
MCH RBC QN AUTO: 30.4 PG (ref 26–34)
MCHC RBC AUTO-ENTMCNC: 36.1 G/DL (ref 30–36.5)
MCV RBC AUTO: 84.2 FL (ref 80–99)
MONOCYTES # BLD: 0.6 K/UL (ref 0–1)
MONOCYTES NFR BLD: 6 % (ref 5–13)
NEUTS SEG # BLD: 8 K/UL (ref 1.8–8)
NEUTS SEG NFR BLD: 78 % (ref 32–75)
NRBC # BLD: 0 K/UL (ref 0–0.01)
NRBC BLD-RTO: 0 PER 100 WBC
PLATELET # BLD AUTO: 299 K/UL (ref 150–400)
PMV BLD AUTO: 10 FL (ref 8.9–12.9)
POTASSIUM SERPL-SCNC: 3.9 MMOL/L (ref 3.5–5.1)
PROT SERPL-MCNC: 7.4 G/DL (ref 6.4–8.2)
RBC # BLD AUTO: 5.33 M/UL (ref 3.8–5.2)
SERVICE CMNT-IMP: ABNORMAL
SODIUM SERPL-SCNC: 139 MMOL/L (ref 136–145)
TROPONIN I SERPL HS-MCNC: 6 NG/L (ref 0–51)
WBC # BLD AUTO: 10.2 K/UL (ref 3.6–11)

## 2024-12-09 PROCEDURE — 2709999900 HC NON-CHARGEABLE SUPPLY: Performed by: SURGERY

## 2024-12-09 PROCEDURE — 6360000002 HC RX W HCPCS: Performed by: ANESTHESIOLOGY

## 2024-12-09 PROCEDURE — 83690 ASSAY OF LIPASE: CPT

## 2024-12-09 PROCEDURE — 99285 EMERGENCY DEPT VISIT HI MDM: CPT

## 2024-12-09 PROCEDURE — 85025 COMPLETE CBC W/AUTO DIFF WBC: CPT

## 2024-12-09 PROCEDURE — 80053 COMPREHEN METABOLIC PANEL: CPT

## 2024-12-09 PROCEDURE — 7100000010 HC PHASE II RECOVERY - FIRST 15 MIN: Performed by: SURGERY

## 2024-12-09 PROCEDURE — 3600000002 HC SURGERY LEVEL 2 BASE: Performed by: SURGERY

## 2024-12-09 PROCEDURE — 93005 ELECTROCARDIOGRAM TRACING: CPT | Performed by: PHYSICIAN ASSISTANT

## 2024-12-09 PROCEDURE — 71045 X-RAY EXAM CHEST 1 VIEW: CPT

## 2024-12-09 PROCEDURE — 45378 DIAGNOSTIC COLONOSCOPY: CPT | Performed by: SURGERY

## 2024-12-09 PROCEDURE — 2580000003 HC RX 258: Performed by: SURGERY

## 2024-12-09 PROCEDURE — 82962 GLUCOSE BLOOD TEST: CPT

## 2024-12-09 PROCEDURE — 3700000001 HC ADD 15 MINUTES (ANESTHESIA): Performed by: SURGERY

## 2024-12-09 PROCEDURE — 3600000012 HC SURGERY LEVEL 2 ADDTL 15MIN: Performed by: SURGERY

## 2024-12-09 PROCEDURE — 3700000000 HC ANESTHESIA ATTENDED CARE: Performed by: SURGERY

## 2024-12-09 PROCEDURE — 84484 ASSAY OF TROPONIN QUANT: CPT

## 2024-12-09 PROCEDURE — 36415 COLL VENOUS BLD VENIPUNCTURE: CPT

## 2024-12-09 PROCEDURE — 7100000011 HC PHASE II RECOVERY - ADDTL 15 MIN: Performed by: SURGERY

## 2024-12-09 RX ORDER — SODIUM CHLORIDE 0.9 % (FLUSH) 0.9 %
5-40 SYRINGE (ML) INJECTION EVERY 12 HOURS SCHEDULED
Status: DISCONTINUED | OUTPATIENT
Start: 2024-12-09 | End: 2024-12-09 | Stop reason: HOSPADM

## 2024-12-09 RX ORDER — SODIUM CHLORIDE 9 MG/ML
INJECTION, SOLUTION INTRAVENOUS PRN
Status: DISCONTINUED | OUTPATIENT
Start: 2024-12-09 | End: 2024-12-09 | Stop reason: HOSPADM

## 2024-12-09 RX ORDER — SODIUM CHLORIDE 0.9 % (FLUSH) 0.9 %
5-40 SYRINGE (ML) INJECTION PRN
Status: DISCONTINUED | OUTPATIENT
Start: 2024-12-09 | End: 2024-12-09 | Stop reason: HOSPADM

## 2024-12-09 RX ORDER — PROPOFOL 10 MG/ML
INJECTION, EMULSION INTRAVENOUS
Status: DISCONTINUED | OUTPATIENT
Start: 2024-12-09 | End: 2024-12-09 | Stop reason: SDUPTHER

## 2024-12-09 RX ORDER — SODIUM CHLORIDE, SODIUM LACTATE, POTASSIUM CHLORIDE, CALCIUM CHLORIDE 600; 310; 30; 20 MG/100ML; MG/100ML; MG/100ML; MG/100ML
INJECTION, SOLUTION INTRAVENOUS CONTINUOUS
Status: DISCONTINUED | OUTPATIENT
Start: 2024-12-09 | End: 2024-12-09 | Stop reason: HOSPADM

## 2024-12-09 RX ORDER — NALOXONE HYDROCHLORIDE 0.4 MG/ML
INJECTION, SOLUTION INTRAMUSCULAR; INTRAVENOUS; SUBCUTANEOUS PRN
Status: DISCONTINUED | OUTPATIENT
Start: 2024-12-09 | End: 2024-12-09 | Stop reason: HOSPADM

## 2024-12-09 RX ORDER — LIDOCAINE HYDROCHLORIDE 20 MG/ML
INJECTION, SOLUTION EPIDURAL; INFILTRATION; INTRACAUDAL; PERINEURAL
Status: DISCONTINUED | OUTPATIENT
Start: 2024-12-09 | End: 2024-12-09 | Stop reason: SDUPTHER

## 2024-12-09 RX ORDER — FENTANYL CITRATE 50 UG/ML
INJECTION, SOLUTION INTRAMUSCULAR; INTRAVENOUS
Status: DISCONTINUED | OUTPATIENT
Start: 2024-12-09 | End: 2024-12-09 | Stop reason: SDUPTHER

## 2024-12-09 RX ADMIN — LIDOCAINE HYDROCHLORIDE 100 MG: 20 INJECTION, SOLUTION EPIDURAL; INFILTRATION; INTRACAUDAL; PERINEURAL at 10:09

## 2024-12-09 RX ADMIN — FENTANYL CITRATE 100 MCG: 50 INJECTION, SOLUTION INTRAMUSCULAR; INTRAVENOUS at 10:09

## 2024-12-09 RX ADMIN — PROPOFOL 100 MG: 10 INJECTION, EMULSION INTRAVENOUS at 10:09

## 2024-12-09 RX ADMIN — SODIUM CHLORIDE, POTASSIUM CHLORIDE, SODIUM LACTATE AND CALCIUM CHLORIDE: 600; 310; 30; 20 INJECTION, SOLUTION INTRAVENOUS at 09:23

## 2024-12-09 RX ADMIN — PROPOFOL 100 MG: 10 INJECTION, EMULSION INTRAVENOUS at 10:16

## 2024-12-09 RX ADMIN — PROPOFOL 100 MG: 10 INJECTION, EMULSION INTRAVENOUS at 10:25

## 2024-12-09 ASSESSMENT — LIFESTYLE VARIABLES
HOW OFTEN DO YOU HAVE A DRINK CONTAINING ALCOHOL: NEVER
HOW MANY STANDARD DRINKS CONTAINING ALCOHOL DO YOU HAVE ON A TYPICAL DAY: PATIENT DOES NOT DRINK

## 2024-12-09 ASSESSMENT — PAIN SCALES - GENERAL
PAINLEVEL_OUTOF10: 8
PAINLEVEL_OUTOF10: 0

## 2024-12-09 ASSESSMENT — PAIN DESCRIPTION - LOCATION: LOCATION: CHEST

## 2024-12-09 ASSESSMENT — PAIN - FUNCTIONAL ASSESSMENT: PAIN_FUNCTIONAL_ASSESSMENT: 0-10

## 2024-12-09 ASSESSMENT — HEART SCORE: ECG: NORMAL

## 2024-12-09 NOTE — DISCHARGE INSTRUCTIONS
Thank You!    It was a pleasure taking care of you in our Emergency Department today. We know that when you come to our Emergency Department, you are entrusting us with your health, comfort, and safety. Our physicians and nurses honor that trust, and truly appreciate the opportunity to care for you and your loved ones.      We also value your feedback. If you receive a survey about your Emergency Department experience today, please fill it out.  We care about our patients' feedback, and we listen to what you have to say.  Thank you.    AVILA Hagan  ________________________________________________________________________  I have included a copy of your lab results and/or radiologic studies from today's visit so you can have them easily available at your follow-up visit. We hope you feel better and please do not hesitate to contact the ED if you have any questions at all!    Recent Results (from the past 12 hour(s))   POCT Glucose    Collection Time: 12/09/24  9:19 AM   Result Value Ref Range    POC Glucose 187 (H) 65 - 117 mg/dL    Performed by: Torito Kuhn RN    EKG 12 Lead    Collection Time: 12/09/24  4:43 PM   Result Value Ref Range    Ventricular Rate 71 BPM    Atrial Rate 71 BPM    P-R Interval 112 ms    QRS Duration 92 ms    Q-T Interval 398 ms    QTc Calculation (Bazett) 432 ms    P Axis 88 degrees    R Axis 48 degrees    T Axis 50 degrees    Diagnosis       Normal sinus rhythm  Normal ECG  No previous ECGs available     CBC with Auto Differential    Collection Time: 12/09/24  4:50 PM   Result Value Ref Range    WBC 10.2 3.6 - 11.0 K/uL    RBC 5.33 (H) 3.80 - 5.20 M/uL    Hemoglobin 16.2 (H) 11.5 - 16.0 g/dL    Hematocrit 44.9 35.0 - 47.0 %    MCV 84.2 80.0 - 99.0 FL    MCH 30.4 26.0 - 34.0 PG    MCHC 36.1 30.0 - 36.5 g/dL    RDW 11.9 11.5 - 14.5 %    Platelets 299 150 - 400 K/uL    MPV 10.0 8.9 - 12.9 FL    Nucleated RBCs 0.0 0  WBC    nRBC 0.00 0.00 - 0.01 K/uL    Neutrophils % 78 (H) 32 - 75

## 2024-12-09 NOTE — OP NOTE
[address]  [address]                            OPERATIVE REPORT      PATIENT NAME: TATYANA HAQUE                : 1970  MED REC NO: [id number]                       ROOM: OR  ACCOUNT NO: [id number]                       ADMIT DATE: 2024  PROVIDER: Gael Lebron MD    DATE OF SERVICE:  2024    PREOPERATIVE DIAGNOSES:  Colon cancer screening.    POSTOPERATIVE DIAGNOSES:  Colon cancer screening.    PROCEDURES PERFORMED:  Colonoscopy.    SURGEON:  Gael Lebron MD    ASSISTANT:  None.    ANESTHESIA:  MAC.    ESTIMATED BLOOD LOSS:  Zero.    SPECIMENS REMOVED:  None.    INTRAOPERATIVE FINDINGS:  Normal colon.     COMPLICATIONS:  None.    IMPLANTS:  None.    INDICATIONS:  Colon cancer screening.    DESCRIPTION OF PROCEDURE:  The patient was brought to the operative theater.  Monitoring devices and nasal cannula O2 were placed per Anesthesia.  The patient was placed in the left-side-down decubitus position.  IV sedation was administered.  A time-out was performed.  Digital rectal exam was performed, which was essentially normal.  A well-lubricated Olympus colonoscope was introduced in the patient's rectum and advanced to the cecum.  The cecum was identified by the appendiceal orifice and intubating the terminal ileum.  The prep was suboptimal, but adequate to proceed.  No significant abnormalities were noted in the cecum or ascending colon.  Transverse colon appeared to be free of disease to the descending and sigmoid colons.  The scope was pulled back into the rectum and retroflexed, which showed a moderate amount of semisolid stool which somewhat limited our visualization.  Within these limitations, no significant abnormalities were noted.  The scope was then straightened out and carefully withdrawn.    She tolerated the procedure well and was transferred to PACU in stable condition.    DRAINS:  None.    DISPOSITION:

## 2024-12-09 NOTE — ED NOTES
Per phlebotomy pt refusing redraw troponin, MD aware at bedside   PROGRESS NOTE:     Patient is a 53y old  Female who presents with a chief complaint of Headache, tinnitus (09 Dec 2023 13:18)      SUBJECTIVE / OVERNIGHT EVENTS: Pt still c/o headaches and visual changes. Back soreness.     ADDITIONAL REVIEW OF SYSTEMS:    MEDICATIONS  (STANDING):  acetaZOLAMIDE    Tablet 500 milliGRAM(s) Oral two times a day  buMETAnide 2 milliGRAM(s) Oral daily  dextrose 5%. 1000 milliLiter(s) (50 mL/Hr) IV Continuous <Continuous>  dextrose 5%. 1000 milliLiter(s) (100 mL/Hr) IV Continuous <Continuous>  dextrose 50% Injectable 25 Gram(s) IV Push once  dextrose 50% Injectable 25 Gram(s) IV Push once  dextrose 50% Injectable 12.5 Gram(s) IV Push once  glucagon  Injectable 1 milliGRAM(s) IntraMuscular once  insulin glargine Injectable (LANTUS) 40 Unit(s) SubCutaneous at bedtime  insulin lispro (ADMELOG) corrective regimen sliding scale   SubCutaneous at bedtime  insulin lispro (ADMELOG) corrective regimen sliding scale   SubCutaneous three times a day before meals  insulin lispro Injectable (ADMELOG) 10 Unit(s) SubCutaneous three times a day before meals  lisinopril 10 milliGRAM(s) Oral daily  naloxegol 25 milliGRAM(s) Oral daily  spironolactone 25 milliGRAM(s) Oral daily    MEDICATIONS  (PRN):  acetaminophen     Tablet .. 650 milliGRAM(s) Oral every 6 hours PRN Temp greater or equal to 38C (100.4F), Mild Pain (1 - 3)  aluminum hydroxide/magnesium hydroxide/simethicone Suspension 30 milliLiter(s) Oral every 4 hours PRN Dyspepsia  baclofen 5 milliGRAM(s) Oral every 8 hours PRN Muscle Spasm  dextrose Oral Gel 15 Gram(s) Oral once PRN Blood Glucose LESS THAN 70 milliGRAM(s)/deciliter  HYDROmorphone  Injectable 0.5 milliGRAM(s) IV Push every 8 hours PRN Severe Pain (7 - 10)  melatonin 3 milliGRAM(s) Oral at bedtime PRN Insomnia  ondansetron Injectable 4 milliGRAM(s) IV Push every 8 hours PRN Nausea and/or Vomiting  oxyCODONE    IR 10 milliGRAM(s) Oral every 6 hours PRN Moderate Pain (4 - 6)      CAPILLARY BLOOD GLUCOSE      POCT Blood Glucose.: 184 mg/dL (10 Dec 2023 09:03)  POCT Blood Glucose.: 167 mg/dL (09 Dec 2023 21:32)  POCT Blood Glucose.: 176 mg/dL (09 Dec 2023 18:10)  POCT Blood Glucose.: 171 mg/dL (09 Dec 2023 12:11)    I&O's Summary    09 Dec 2023 07:01  -  10 Dec 2023 07:00  --------------------------------------------------------  IN: 0 mL / OUT: 3200 mL / NET: -3200 mL        PHYSICAL EXAM:  Vital Signs Last 24 Hrs  T(C): 36.7 (10 Dec 2023 10:54), Max: 36.8 (10 Dec 2023 05:13)  T(F): 98 (10 Dec 2023 10:54), Max: 98.2 (10 Dec 2023 05:13)  HR: 77 (10 Dec 2023 10:54) (60 - 77)  BP: 109/54 (10 Dec 2023 10:54) (109/54 - 125/60)  BP(mean): --  RR: 18 (10 Dec 2023 10:54) (18 - 18)  SpO2: 100% (10 Dec 2023 10:54) (97% - 100%)    Parameters below as of 10 Dec 2023 10:54  Patient On (Oxygen Delivery Method): room air    GENERAL: NAD   HEENT:  EOMI, PERRLA, conjunctiva and sclera clear, oral mucosa moist, clear w/o any exudate   NECK: Supple, No JVD  CHEST/LUNG: Clear to auscultation bilaterally; No wheeze  HEART: Regular rate and rhythm; No murmurs, rubs, or gallops  ABDOMEN: Soft, Nontender, Nondistended; Bowel sounds present  EXTREMITIES:  2+ Peripheral Pulses, No clubbing, cyanosis, or edema  PSYCH: AAOx3, normal affect  NEUROLOGY: cranial nerve 2-12 grossly intact. strength 5/5 in UE and LE, sensation grossly intact   SKIN: No rashes or lesions    LABS:                        12.0   9.66  )-----------( 460      ( 10 Dec 2023 04:37 )             36.7     12-10    137  |  105  |  16  ----------------------------<  176<H>  3.6   |  20<L>  |  0.88    Ca    9.1      10 Dec 2023 04:37  Phos  3.2     12-10  Mg     1.70     12-10            Urinalysis Basic - ( 10 Dec 2023 04:37 )    Color: x / Appearance: x / SG: x / pH: x  Gluc: 176 mg/dL / Ketone: x  / Bili: x / Urobili: x   Blood: x / Protein: x / Nitrite: x   Leuk Esterase: x / RBC: x / WBC x   Sq Epi: x / Non Sq Epi: x / Bacteria: x          RADIOLOGY & ADDITIONAL TESTS:  Results Reviewed:   Imaging Personally Reviewed:  Electrocardiogram Personally Reviewed:    COORDINATION OF CARE:  Care Discussed with Consultants/Other Providers [Y/N]:  Prior or Outpatient Records Reviewed [Y/N]:

## 2024-12-09 NOTE — INTERVAL H&P NOTE
Update History & Physical    The patient's History and Physical of December 6, 2024 was reviewed with the patient and I examined the patient. There was no change. The surgical site was confirmed by the patient and me.     Plan: The risks, benefits, expected outcome, and alternative to the recommended procedure have been discussed with the patient. Patient understands and wants to proceed with the procedure.     Electronically signed by Gael Lebron MD on 12/9/2024 at 10:00 AM

## 2024-12-09 NOTE — ED TRIAGE NOTES
Pt arrived with c/o chest and back pain that started one hour ago, took tums for this issue with no relief

## 2024-12-09 NOTE — ANESTHESIA POSTPROCEDURE EVALUATION
Department of Anesthesiology  Postprocedure Note    Patient: Sonal Cottrell  MRN: [id number]  YOB: 1970  Date of evaluation: 12/9/2024    Procedure Summary       Date: 12/09/24 Room / Location: [facility] MAIN OR [address] [place] [address] MAIN OR    Anesthesia Start: 1006 Anesthesia Stop: 1042    Procedure: COLONOSCOPY (Lower GI Region) Diagnosis:       Colon cancer screening      (Colon cancer screening [Z12.11])    Surgeons: Gael Lebron MD Responsible Provider: Oanh Abarca MD    Anesthesia Type: MAC ASA Status: 3            Anesthesia Type: No value filed.    Nallely Phase I:      Nallely Phase II: Nallely Score: 10    Anesthesia Post Evaluation    Patient location during evaluation: bedside  Patient participation: complete - patient participated  Level of consciousness: awake and alert  Pain score: 0  Airway patency: patent  Nausea & Vomiting: no nausea  Cardiovascular status: hemodynamically stable  Respiratory status: acceptable  Hydration status: stable  Pain management: adequate    No notable events documented.

## 2024-12-09 NOTE — ED PROVIDER NOTES
against the risks of further testing, imaging, or hospitalization. At this time, I estimate the risks of additional testing, imaging, or hospitalization to be equal to or greater than the risk of discharge(in the case of discharge home).      The patient's HEART Score is 3. In rare cases, I give patients with HEART Score of 4 the option of discharge, but only when they meet criteria for \"Low 4,\" meaning that HST was used, and the 4 is not from a highly suspicious story, highly suspicious EKG, or positive cardiac enzymes.  In these selected cases, the risk of a \"Low 4\" is still most likely lower than the risk of admission and further testing/imaging. [id number]    SHARED DECISION MAKING:   I discussed my risk assessment with the patient. The patient understands and consents to the risk of disposition/plan, as well as the risk of uncertainty in estimating outcomes. [id number]          Heart Score: 3    ED Course as of 12/09/24 1813   Mon Dec 09, 2024   1645 EKG at 4:43 PM on 12/9/2024 interpreted by me: Normal sinus rhythm, 71 bpm, normal axis, normal NH, QRS, QTc intervals, no ischemic changes [AO]   1648 EKG shown to Dr. Leigh for interpretation.  [TK]   1808 Patient has been re-evaluated. She is feeling much better. She has no discomfort at all right now. She notes that symptoms resolved after she burped.  [TK]      ED Course User Index  [AO] Alda Leigh MD  [TK] Lucy Lott PA       Disposition Considerations (Tests not done, Shared Decision Making, Pt Expectation of Test or Tx.): See above. Also, considered getting repeat troponin. However, patient's CP resolved completely and she declined repeat troponin due to resolution of symptoms.      FINAL IMPRESSION     1. Atypical chest pain        DISPOSITION/PLAN   DISPOSITION Decision To Discharge 12/09/2024 06:09:41 PM   DISPOSITION CONDITION Stable     Discharge Note: The patient is stable for discharge home. The signs, symptoms,

## 2024-12-09 NOTE — BRIEF OP NOTE
Brief Postoperative Note      Patient: Sonal Cottrell  YOB: 1970  MRN: [id number]    Date of Procedure: 12/9/2024    Pre-Op Diagnosis Codes:      * Colon cancer screening [Z12.11]    Post-Op Diagnosis: Same       Procedure(s):  COLONOSCOPY    Surgeon(s):  Gael Lebron MD    Assistant:  * No surgical staff found *    Anesthesia: Monitor Anesthesia Care    Estimated Blood Loss (mL): 0    Complications: None    Specimens:   * No specimens in log *    Implants:  * No implants in log *      Drains:   [REMOVED] Open Drain 04/04/24 Left Buttock (Removed)       Findings:  Infection Present At Time Of Surgery (PATOS) (choose all levels that have infection present):  No infection present  Other Findings:   Normal colon    Electronically signed by Gael Lebron MD on 12/9/2024 at 10:41 AM

## 2024-12-10 LAB
EKG ATRIAL RATE: 71 BPM
EKG DIAGNOSIS: NORMAL
EKG P AXIS: 88 DEGREES
EKG P-R INTERVAL: 112 MS
EKG Q-T INTERVAL: 398 MS
EKG QRS DURATION: 92 MS
EKG QTC CALCULATION (BAZETT): 432 MS
EKG R AXIS: 48 DEGREES
EKG T AXIS: 50 DEGREES
EKG VENTRICULAR RATE: 71 BPM

## 2025-01-04 PROBLEM — Z12.11 COLON CANCER SCREENING: Status: RESOLVED | Noted: 2024-10-23 | Resolved: 2025-01-04

## 2025-03-03 ENCOUNTER — HOSPITAL ENCOUNTER (EMERGENCY)
Facility: HOSPITAL | Age: 55
Discharge: HOME | End: 2025-03-03
Attending: EMERGENCY MEDICINE

## 2025-03-03 VITALS
WEIGHT: 225 LBS | SYSTOLIC BLOOD PRESSURE: 170 MMHG | DIASTOLIC BLOOD PRESSURE: 84 MMHG | BODY MASS INDEX: 41.41 KG/M2 | TEMPERATURE: 97.9 F | RESPIRATION RATE: 16 BRPM | HEART RATE: 83 BPM | HEIGHT: 62 IN | OXYGEN SATURATION: 98 %

## 2025-03-03 DIAGNOSIS — L02.215 PERINEAL ABSCESS: Primary | ICD-10-CM

## 2025-03-03 PROCEDURE — 56405 I&D VULVA/PERINEAL ABSCESS: CPT

## 2025-03-03 PROCEDURE — 6370000000 HC RX 637 (ALT 250 FOR IP): Performed by: EMERGENCY MEDICINE

## 2025-03-03 PROCEDURE — 10060 I&D ABSCESS SIMPLE/SINGLE: CPT

## 2025-03-03 PROCEDURE — 6360000002 HC RX W HCPCS: Performed by: EMERGENCY MEDICINE

## 2025-03-03 PROCEDURE — 99283 EMERGENCY DEPT VISIT LOW MDM: CPT

## 2025-03-03 RX ORDER — CLINDAMYCIN HYDROCHLORIDE 300 MG/1
300 CAPSULE ORAL 3 TIMES DAILY
Qty: 21 CAPSULE | Refills: 0 | Status: SHIPPED | OUTPATIENT
Start: 2025-03-03 | End: 2025-03-10

## 2025-03-03 RX ORDER — LIDOCAINE HYDROCHLORIDE AND EPINEPHRINE 10; 10 MG/ML; UG/ML
20 INJECTION, SOLUTION INFILTRATION; PERINEURAL ONCE
Status: COMPLETED | OUTPATIENT
Start: 2025-03-03 | End: 2025-03-03

## 2025-03-03 RX ORDER — LIDOCAINE AND PRILOCAINE 25; 25 MG/G; MG/G
CREAM TOPICAL ONCE
Status: COMPLETED | OUTPATIENT
Start: 2025-03-03 | End: 2025-03-03

## 2025-03-03 RX ORDER — OXYCODONE HYDROCHLORIDE 5 MG/1
5 TABLET ORAL EVERY 6 HOURS PRN
Qty: 12 TABLET | Refills: 0 | Status: SHIPPED | OUTPATIENT
Start: 2025-03-03 | End: 2025-03-06

## 2025-03-03 RX ADMIN — LIDOCAINE HYDROCHLORIDE,EPINEPHRINE BITARTRATE 20 ML: 10; .01 INJECTION, SOLUTION INFILTRATION; PERINEURAL at 16:33

## 2025-03-03 RX ADMIN — LIDOCAINE AND PRILOCAINE: 25; 25 CREAM TOPICAL at 15:42

## 2025-03-03 ASSESSMENT — PAIN - FUNCTIONAL ASSESSMENT: PAIN_FUNCTIONAL_ASSESSMENT: 0-10

## 2025-03-03 ASSESSMENT — LIFESTYLE VARIABLES
HOW MANY STANDARD DRINKS CONTAINING ALCOHOL DO YOU HAVE ON A TYPICAL DAY: PATIENT DOES NOT DRINK
HOW OFTEN DO YOU HAVE A DRINK CONTAINING ALCOHOL: NEVER

## 2025-03-03 ASSESSMENT — PAIN SCALES - GENERAL
PAINLEVEL_OUTOF10: 7
PAINLEVEL_OUTOF10: 0

## 2025-03-03 ASSESSMENT — PAIN DESCRIPTION - ORIENTATION: ORIENTATION: LEFT

## 2025-03-03 ASSESSMENT — PAIN DESCRIPTION - DESCRIPTORS: DESCRIPTORS: DISCOMFORT

## 2025-03-03 NOTE — ED TRIAGE NOTES
Pt arrived with complaint of labia abscess.  Pt reports an abscess since Wednesday this is uncomfortable and with a subjective fever.  Pt took naproxen this am and rates her pain  as a 7/10. Pt is awake alert and oriented X 4,  pt ambulated to room 2 with a steady gait.  Pt educated on ER flow

## 2025-03-03 NOTE — DISCHARGE INSTRUCTIONS
Please use warm compresses or sitz bath's on the area 4 times a day for 10 to 15 minutes.  You can cut the loop drain on Thursday night.  I am working in the emergency department on Friday between 7 AM and 7 PM and can see you for a reassessment and removal of the drain.    Come back to the emergency department immediately for any fever or new or worsening symptoms.

## 2025-03-03 NOTE — ED PROVIDER NOTES
[facility] EMERGENCY DEPARTMENT  EMERGENCY DEPARTMENT ENCOUNTER       Pt Name: Sonal Cottrell  MRN: [id number]  Birthdate 1970  Date of evaluation: 3/3/2025  Provider: Milka Dill MD   PCP: Malgorzata Mac APRN - NP  Note Started: 3:46 PM EST 3/3/25     CHIEF COMPLAINT       Chief Complaint   Patient presents with    Abscess        HISTORY OF PRESENT ILLNESS: 1 or more elements      History From: patient, History limited by: none     Sonal Cottrell is a 54 y.o. female presents to ED for swelling/pain on labia.       Please See MDM for Additional Details of the HPI/PMH  Nursing Notes were all reviewed and agreed with or any disagreements were addressed in the HPI.     REVIEW OF SYSTEMS        Positives and Pertinent negatives as per HPI.    PAST HISTORY     Past Medical History:  Past Medical History:   Diagnosis Date    Diabetes mellitus (HCC)     Hepatic steatosis     History of ITP     Hypoalbuminemia     Morbid obesity        Past Surgical History:  Past Surgical History:   Procedure Laterality Date    ABSCESS DRAINAGE Left 2024    LEFT GLUTEAL WOUND SHARP DEBRIDEMENT SKIN SUBCUTANEOUS TISSUE DRAINAIGE OF ABSCESS COMPLICATED performed by Sanjay Gabriel MD at [facility] MAIN OR    CARPAL TUNNEL RELEASE Bilateral     COLONOSCOPY N/A 2024    COLONOSCOPY performed by Gael Lebron MD at [facility] MAIN OR       Family History:  Family History   Problem Relation Age of Onset    Diabetes Mother     Diabetes Father     Breast Cancer Sister 47       Social History:  Social History     Tobacco Use    Smoking status: Former     Current packs/day: 0.00     Average packs/day: 0.5 packs/day for 36.0 years (18.0 ttl pk-yrs)     Types: Cigarettes     Start date:      Quit date:      Years since quittin.1     Passive exposure: Never    Smokeless tobacco: Never   Vaping Use    Vaping status: Never Used   Substance Use Topics    Alcohol use: Not Currently    Drug use: Never

## 2025-03-07 ENCOUNTER — HOSPITAL ENCOUNTER (EMERGENCY)
Facility: HOSPITAL | Age: 55
Discharge: HOME | End: 2025-03-07
Attending: EMERGENCY MEDICINE
Payer: COMMERCIAL

## 2025-03-07 VITALS
SYSTOLIC BLOOD PRESSURE: 151 MMHG | HEIGHT: 62 IN | OXYGEN SATURATION: 96 % | BODY MASS INDEX: 41.41 KG/M2 | WEIGHT: 225 LBS | DIASTOLIC BLOOD PRESSURE: 79 MMHG | TEMPERATURE: 98.2 F | HEART RATE: 80 BPM | RESPIRATION RATE: 18 BRPM

## 2025-03-07 DIAGNOSIS — Z51.89 WOUND CHECK, ABSCESS: Primary | ICD-10-CM

## 2025-03-07 PROCEDURE — 99282 EMERGENCY DEPT VISIT SF MDM: CPT

## 2025-03-07 ASSESSMENT — PAIN - FUNCTIONAL ASSESSMENT: PAIN_FUNCTIONAL_ASSESSMENT: 0-10

## 2025-03-07 ASSESSMENT — PAIN SCALES - GENERAL: PAINLEVEL_OUTOF10: 0

## 2025-03-07 NOTE — ED TRIAGE NOTES
Pt arrived with complaint of wound check.  Pt reports she is concerned that it may be filling back up,  pt was seen on 03/03/25 for a perineal abscess and a drain was placed.  Pt is awake alert and oriented X 4,  pt ambulated to room 6 with a steady gait.  Pt educated on ER flow

## 2025-03-07 NOTE — DISCHARGE INSTRUCTIONS
Please continue taking your antibiotics as prescribed.  Try doing sitz bath's for 10 to 15 minutes 4 times a day to help the abscess continue to drain.    Follow-up with your gynecologist for an assessment of the area.  Come back to the emergency department immediately for any increased swelling, fever, or any new or worsening symptoms.

## 2025-03-07 NOTE — ED PROVIDER NOTES
[facility] EMERGENCY DEPARTMENT  EMERGENCY DEPARTMENT ENCOUNTER       Pt Name: Sonal Cottrell  MRN: [id number]  Birthdate 1970  Date of evaluation: 3/7/2025  Provider: Milka Dill MD   PCP: Malgorzata Mac APRN - NP  Note Started: 1:01 PM EST 3/7/25     CHIEF COMPLAINT       Chief Complaint   Patient presents with    Wound Check        HISTORY OF PRESENT ILLNESS: 1 or more elements      History From: patient, History limited by: none     Sonal Cottrell is a 54 y.o. female presents to the emergency department for a wound check of her abscess.       Please See MDM for Additional Details of the HPI/PMH  Nursing Notes were all reviewed and agreed with or any disagreements were addressed in the HPI.     REVIEW OF SYSTEMS        Positives and Pertinent negatives as per HPI.    PAST HISTORY     Past Medical History:  Past Medical History:   Diagnosis Date    Diabetes mellitus (HCC)     Hepatic steatosis     History of ITP     Hypoalbuminemia     Morbid obesity        Past Surgical History:  Past Surgical History:   Procedure Laterality Date    ABSCESS DRAINAGE Left 2024    LEFT GLUTEAL WOUND SHARP DEBRIDEMENT SKIN SUBCUTANEOUS TISSUE DRAINAIGE OF ABSCESS COMPLICATED performed by Sanjay Gabriel MD at [facility] MAIN OR    CARPAL TUNNEL RELEASE Bilateral     COLONOSCOPY N/A 2024    COLONOSCOPY performed by Gael Lebron MD at [facility] MAIN OR       Family History:  Family History   Problem Relation Age of Onset    Diabetes Mother     Diabetes Father     Breast Cancer Sister 47       Social History:  Social History     Tobacco Use    Smoking status: Former     Current packs/day: 0.00     Average packs/day: 0.5 packs/day for 36.0 years (18.0 ttl pk-yrs)     Types: Cigarettes     Start date:      Quit date:      Years since quittin.1     Passive exposure: Never    Smokeless tobacco: Never   Vaping Use    Vaping status: Never Used   Substance Use Topics    Alcohol use: Not Currently     include: educational information regarding their diagnosis and treatment, and list of reasons why they would want to return to the ED prior to their follow-up appointment, should her condition change.     CLINICAL IMPRESSION    Discharge Note: The patient is stable for discharge home. The signs, symptoms, diagnosis, and discharge instructions have been discussed, understanding conveyed, and agreed upon. The patient is to follow up as recommended or return to ER should their symptoms worsen.      PATIENT REFERRED TO:  Your OBGYN    Schedule an appointment as soon as possible for a visit          DISCHARGE MEDICATIONS:     Medication List        ASK your doctor about these medications      Aspirin Low Dose 81 MG EC tablet  Generic drug: aspirin  TAKE 1 TABLET BY MOUTH EVERY DAY     clindamycin 300 MG capsule  Commonly known as: CLEOCIN  Take 1 capsule by mouth 3 times daily for 7 days     empagliflozin 10 MG tablet  Commonly known as: Jardiance  Take 1 tablet by mouth daily                DISCONTINUED MEDICATIONS:  Current Discharge Medication List          I am the Primary Clinician of Record.   Milka Dill MD (electronically signed)    (Please note that parts of this dictation were completed with voice recognition software. Quite often unanticipated grammatical, syntax, homophones, and other interpretive errors are inadvertently transcribed by the computer software. Please disregards these errors. Please excuse any errors that have escaped final proofreading.)         Milka Dill MD  03/07/25 0000

## 2025-03-18 SDOH — ECONOMIC STABILITY: INCOME INSECURITY: IN THE LAST 12 MONTHS, WAS THERE A TIME WHEN YOU WERE NOT ABLE TO PAY THE MORTGAGE OR RENT ON TIME?: NO

## 2025-03-18 SDOH — ECONOMIC STABILITY: FOOD INSECURITY: WITHIN THE PAST 12 MONTHS, THE FOOD YOU BOUGHT JUST DIDN'T LAST AND YOU DIDN'T HAVE MONEY TO GET MORE.: NEVER TRUE

## 2025-03-18 SDOH — ECONOMIC STABILITY: FOOD INSECURITY: WITHIN THE PAST 12 MONTHS, YOU WORRIED THAT YOUR FOOD WOULD RUN OUT BEFORE YOU GOT MONEY TO BUY MORE.: NEVER TRUE

## 2025-03-18 SDOH — ECONOMIC STABILITY: TRANSPORTATION INSECURITY
IN THE PAST 12 MONTHS, HAS LACK OF TRANSPORTATION KEPT YOU FROM MEETINGS, WORK, OR FROM GETTING THINGS NEEDED FOR DAILY LIVING?: NO

## 2025-03-18 SDOH — ECONOMIC STABILITY: TRANSPORTATION INSECURITY
IN THE PAST 12 MONTHS, HAS THE LACK OF TRANSPORTATION KEPT YOU FROM MEDICAL APPOINTMENTS OR FROM GETTING MEDICATIONS?: NO

## 2025-03-21 ENCOUNTER — OFFICE VISIT (OUTPATIENT)
Age: 55
End: 2025-03-21
Payer: COMMERCIAL

## 2025-03-21 VITALS
RESPIRATION RATE: 16 BRPM | SYSTOLIC BLOOD PRESSURE: 131 MMHG | OXYGEN SATURATION: 98 % | HEART RATE: 77 BPM | WEIGHT: 226.6 LBS | BODY MASS INDEX: 41.7 KG/M2 | HEIGHT: 62 IN | DIASTOLIC BLOOD PRESSURE: 85 MMHG | TEMPERATURE: 97.4 F

## 2025-03-21 DIAGNOSIS — E11.65 TYPE 2 DIABETES MELLITUS WITH HYPERGLYCEMIA, WITHOUT LONG-TERM CURRENT USE OF INSULIN (HCC): Primary | ICD-10-CM

## 2025-03-21 DIAGNOSIS — N76.4 LABIAL ABSCESS: ICD-10-CM

## 2025-03-21 DIAGNOSIS — I25.10 CORONARY ARTERY DISEASE INVOLVING NATIVE CORONARY ARTERY OF NATIVE HEART WITHOUT ANGINA PECTORIS: ICD-10-CM

## 2025-03-21 LAB — HBA1C MFR BLD: 7.8 %

## 2025-03-21 PROCEDURE — 83036 HEMOGLOBIN GLYCOSYLATED A1C: CPT | Performed by: NURSE PRACTITIONER

## 2025-03-21 RX ORDER — CLINDAMYCIN HYDROCHLORIDE 300 MG/1
CAPSULE ORAL
COMMUNITY
Start: 2025-03-14

## 2025-03-21 ASSESSMENT — PATIENT HEALTH QUESTIONNAIRE - PHQ9
SUM OF ALL RESPONSES TO PHQ QUESTIONS 1-9: 2
SUM OF ALL RESPONSES TO PHQ QUESTIONS 1-9: 2
1. LITTLE INTEREST OR PLEASURE IN DOING THINGS: SEVERAL DAYS
SUM OF ALL RESPONSES TO PHQ QUESTIONS 1-9: 2
SUM OF ALL RESPONSES TO PHQ QUESTIONS 1-9: 2
2. FEELING DOWN, DEPRESSED OR HOPELESS: SEVERAL DAYS

## 2025-03-21 NOTE — PROGRESS NOTES
\"Have you been to the ER, urgent care clinic since your last visit?  Hospitalized since your last visit?\"    Yes, [facility] - Labia cyst    “Have you seen or consulted any other health care providers outside our system since your last visit?”    NO     “Have you had a pap smear?”    NO    No cervical cancer screening on file       “Have you had a diabetic eye exam?”    NO     No diabetic eye exam on file          Chief Complaint   Patient presents with    Diabetes    Fatigue       Vitals:    03/21/25 1536   BP: 131/85   Pulse: 77   Resp: 16   Temp: 97.4 °F (36.3 °C)   SpO2: 98%     
DEBRIDEMENT SKIN SUBCUTANEOUS TISSUE DRAINAIGE OF ABSCESS COMPLICATED performed by Sanjay Gabriel MD at [facility] MAIN OR    CARPAL TUNNEL RELEASE Bilateral 2005    COLONOSCOPY N/A 2024    COLONOSCOPY performed by Gael Lebron MD at [facility] MAIN OR       Social History     Tobacco Use   Smoking Status Former    Current packs/day: 0.00    Average packs/day: 0.5 packs/day for 36.0 years (18.0 ttl pk-yrs)    Types: Cigarettes    Start date:     Quit date:     Years since quittin.2    Passive exposure: Never   Smokeless Tobacco Never       Social History     Socioeconomic History    Marital status:    Tobacco Use    Smoking status: Former     Current packs/day: 0.00     Average packs/day: 0.5 packs/day for 36.0 years (18.0 ttl pk-yrs)     Types: Cigarettes     Start date:      Quit date:      Years since quittin.2     Passive exposure: Never    Smokeless tobacco: Never   Vaping Use    Vaping status: Never Used   Substance and Sexual Activity    Alcohol use: Not Currently    Drug use: Never    Sexual activity: Not Currently     Partners: Male     Social Drivers of Health     Financial Resource Strain: Low Risk  (2024)    Overall Financial Resource Strain (CARDIA)     Difficulty of Paying Living Expenses: Not very hard   Transportation Needs: Unknown (2024)    PRAPARE - Transportation     Lack of Transportation (Non-Medical): No   Physical Activity: Insufficiently Active (6/3/2024)    Exercise Vital Sign     Days of Exercise per Week: 3 days     Minutes of Exercise per Session: 20 min   Social Connections: Feeling Socially Integrated (5/15/2024)    OASIS : Social Isolation     Frequency of experiencing loneliness or isolation: Never   Housing Stability: Unknown (3/18/2025)    Housing Stability Vital Sign     Number of Times Moved in the Last Year: 1     Homeless in the Last Year: No       Family History   Problem Relation Age of Onset    Diabetes Mother     Diabetes Father

## 2025-04-15 RX ORDER — GLIPIZIDE 2.5 MG/1
2.5 TABLET, EXTENDED RELEASE ORAL DAILY
Qty: 90 TABLET | Refills: 0 | Status: SHIPPED | OUTPATIENT
Start: 2025-04-15

## 2025-05-29 RX ORDER — EMPAGLIFLOZIN 10 MG/1
10 TABLET, FILM COATED ORAL DAILY
Qty: 90 TABLET | Refills: 0 | Status: SHIPPED | OUTPATIENT
Start: 2025-05-29

## 2025-05-29 NOTE — TELEPHONE ENCOUNTER
Patient requesting refill on     Requested Prescriptions     Pending Prescriptions Disp Refills    JARDIANCE 10 MG tablet [Pharmacy Med Name: JARDIANCE 10 MG TABLET] 90 tablet 0     Sig: TAKE 1 TABLET BY MOUTH EVERY DAY        Last OV 3/21/2025

## 2025-07-10 RX ORDER — GLIPIZIDE 2.5 MG/1
2.5 TABLET, EXTENDED RELEASE ORAL DAILY
Qty: 90 TABLET | Refills: 0 | Status: SHIPPED | OUTPATIENT
Start: 2025-07-10

## 2025-07-11 ENCOUNTER — TRANSCRIBE ORDERS (OUTPATIENT)
Facility: HOSPITAL | Age: 55
End: 2025-07-11

## 2025-07-11 DIAGNOSIS — Z12.31 SCREENING MAMMOGRAM FOR BREAST CANCER: Primary | ICD-10-CM

## 2025-08-12 ENCOUNTER — HOSPITAL ENCOUNTER (OUTPATIENT)
Facility: HOSPITAL | Age: 55
Discharge: HOME | End: 2025-08-15
Payer: COMMERCIAL

## 2025-08-12 ENCOUNTER — OFFICE VISIT (OUTPATIENT)
Age: 55
End: 2025-08-12
Payer: COMMERCIAL

## 2025-08-12 VITALS
DIASTOLIC BLOOD PRESSURE: 80 MMHG | HEART RATE: 80 BPM | RESPIRATION RATE: 18 BRPM | TEMPERATURE: 97.3 F | OXYGEN SATURATION: 98 % | HEIGHT: 62 IN | BODY MASS INDEX: 42.88 KG/M2 | SYSTOLIC BLOOD PRESSURE: 120 MMHG | WEIGHT: 233 LBS

## 2025-08-12 DIAGNOSIS — I25.10 CORONARY ARTERY DISEASE INVOLVING NATIVE CORONARY ARTERY OF NATIVE HEART WITHOUT ANGINA PECTORIS: ICD-10-CM

## 2025-08-12 DIAGNOSIS — E11.65 TYPE 2 DIABETES MELLITUS WITH HYPERGLYCEMIA, WITHOUT LONG-TERM CURRENT USE OF INSULIN (HCC): Primary | ICD-10-CM

## 2025-08-12 DIAGNOSIS — Z12.31 SCREENING MAMMOGRAM FOR BREAST CANCER: ICD-10-CM

## 2025-08-12 PROCEDURE — 36415 COLL VENOUS BLD VENIPUNCTURE: CPT | Performed by: NURSE PRACTITIONER

## 2025-08-12 PROCEDURE — 3051F HG A1C>EQUAL 7.0%<8.0%: CPT | Performed by: NURSE PRACTITIONER

## 2025-08-12 PROCEDURE — 77063 BREAST TOMOSYNTHESIS BI: CPT

## 2025-08-12 PROCEDURE — 90677 PCV20 VACCINE IM: CPT | Performed by: NURSE PRACTITIONER

## 2025-08-12 PROCEDURE — 99213 OFFICE O/P EST LOW 20 MIN: CPT | Performed by: NURSE PRACTITIONER

## 2025-08-12 PROCEDURE — 90471 IMMUNIZATION ADMIN: CPT | Performed by: NURSE PRACTITIONER

## 2025-08-12 ASSESSMENT — PATIENT HEALTH QUESTIONNAIRE - PHQ9
SUM OF ALL RESPONSES TO PHQ QUESTIONS 1-9: 0
SUM OF ALL RESPONSES TO PHQ QUESTIONS 1-9: 0
2. FEELING DOWN, DEPRESSED OR HOPELESS: NOT AT ALL
1. LITTLE INTEREST OR PLEASURE IN DOING THINGS: NOT AT ALL
SUM OF ALL RESPONSES TO PHQ QUESTIONS 1-9: 0
SUM OF ALL RESPONSES TO PHQ QUESTIONS 1-9: 0

## 2025-08-13 LAB
ALBUMIN SERPL-MCNC: 4.4 G/DL (ref 3.5–5.2)
ALBUMIN/GLOB SERPL: 1.6 (ref 1.1–2.2)
ALP SERPL-CCNC: 91 U/L (ref 35–104)
ALT SERPL-CCNC: 102 U/L (ref 10–35)
ANION GAP SERPL CALC-SCNC: 13 MMOL/L (ref 2–14)
AST SERPL-CCNC: 59 U/L (ref 10–35)
BASOPHILS # BLD: 0.08 K/UL (ref 0–0.1)
BASOPHILS NFR BLD: 1 % (ref 0–1)
BILIRUB SERPL-MCNC: 0.6 MG/DL (ref 0–1.2)
BUN SERPL-MCNC: 18 MG/DL (ref 6–20)
BUN/CREAT SERPL: 21 (ref 12–20)
CALCIUM SERPL-MCNC: 10.2 MG/DL (ref 8.6–10)
CHLORIDE SERPL-SCNC: 100 MMOL/L (ref 98–107)
CHOLEST SERPL-MCNC: 148 MG/DL (ref 0–200)
CO2 SERPL-SCNC: 24 MMOL/L (ref 20–29)
CREAT SERPL-MCNC: 0.85 MG/DL (ref 0.6–1)
DIFFERENTIAL METHOD BLD: ABNORMAL
EOSINOPHIL # BLD: 0.12 K/UL (ref 0–0.4)
EOSINOPHIL NFR BLD: 1.5 % (ref 0–7)
ERYTHROCYTE [DISTWIDTH] IN BLOOD BY AUTOMATED COUNT: 12.1 % (ref 11.5–14.5)
EST. AVERAGE GLUCOSE BLD GHB EST-MCNC: 255 MG/DL
GLOBULIN SER CALC-MCNC: 2.7 G/DL (ref 2–4)
GLUCOSE SERPL-MCNC: 262 MG/DL (ref 65–100)
HBA1C MFR BLD: 10.5 % (ref 4–5.6)
HCT VFR BLD AUTO: 47.7 % (ref 35–47)
HDLC SERPL-MCNC: 43 MG/DL (ref 40–60)
HDLC SERPL: 3.4 (ref 0–5)
HGB BLD-MCNC: 16.6 G/DL (ref 11.5–16)
IMM GRANULOCYTES # BLD AUTO: 0.02 K/UL (ref 0–0.04)
IMM GRANULOCYTES NFR BLD AUTO: 0.3 % (ref 0–0.5)
LDLC SERPL CALC-MCNC: 67 MG/DL (ref 0–100)
LYMPHOCYTES # BLD: 2.44 K/UL (ref 0.8–3.5)
LYMPHOCYTES NFR BLD: 30.5 % (ref 12–49)
MCH RBC QN AUTO: 30.1 PG (ref 26–34)
MCHC RBC AUTO-ENTMCNC: 34.8 G/DL (ref 30–36.5)
MCV RBC AUTO: 86.4 FL (ref 80–99)
MONOCYTES # BLD: 0.74 K/UL (ref 0–1)
MONOCYTES NFR BLD: 9.3 % (ref 5–13)
NEUTS SEG # BLD: 4.6 K/UL (ref 1.8–8)
NEUTS SEG NFR BLD: 57.4 % (ref 32–75)
NRBC # BLD: 0 K/UL (ref 0–0.01)
NRBC BLD-RTO: 0 PER 100 WBC
PLATELET # BLD AUTO: 268 K/UL (ref 150–400)
PMV BLD AUTO: 12.5 FL (ref 8.9–12.9)
POTASSIUM SERPL-SCNC: 4.9 MMOL/L (ref 3.5–5.1)
PROT SERPL-MCNC: 7.1 G/DL (ref 6.4–8.3)
RBC # BLD AUTO: 5.52 M/UL (ref 3.8–5.2)
SODIUM SERPL-SCNC: 137 MMOL/L (ref 136–145)
TRIGL SERPL-MCNC: 190 MG/DL (ref 0–150)
VLDLC SERPL CALC-MCNC: 38 MG/DL
WBC # BLD AUTO: 8 K/UL (ref 3.6–11)

## 2025-08-19 ENCOUNTER — HOSPITAL ENCOUNTER (OUTPATIENT)
Facility: HOSPITAL | Age: 55
Discharge: HOME | End: 2025-08-22
Payer: COMMERCIAL

## 2025-08-19 DIAGNOSIS — R79.89 ELEVATED LFTS: ICD-10-CM

## 2025-08-19 PROCEDURE — 76705 ECHO EXAM OF ABDOMEN: CPT

## 2025-08-26 DIAGNOSIS — E11.65 TYPE 2 DIABETES MELLITUS WITH HYPERGLYCEMIA, WITHOUT LONG-TERM CURRENT USE OF INSULIN (HCC): ICD-10-CM

## 2025-08-26 RX ORDER — AVOBENZONE, HOMOSALATE, OCTISALATE, OCTOCRYLENE 30; 40; 45; 26 MG/ML; MG/ML; MG/ML; MG/ML
1 CREAM TOPICAL 2 TIMES DAILY
Qty: 100 EACH | Refills: 5 | Status: SHIPPED | OUTPATIENT
Start: 2025-08-26

## 2025-08-26 RX ORDER — BLOOD-GLUCOSE METER
1 KIT MISCELLANEOUS 2 TIMES DAILY
Qty: 1 KIT | Refills: 0 | Status: SHIPPED | OUTPATIENT
Start: 2025-08-26

## 2025-08-26 RX ORDER — GLUCOSAMINE HCL/CHONDROITIN SU 500-400 MG
CAPSULE ORAL
Qty: 100 STRIP | Refills: 5 | Status: SHIPPED | OUTPATIENT
Start: 2025-08-26

## (undated) DEVICE — GAUZE,SPONGE,FLUFF,6"X6.75",STRL,5/TRAY: Brand: MEDLINE

## (undated) DEVICE — HYPODERMIC SAFETY NEEDLE: Brand: MONOJECT

## (undated) DEVICE — GAUZE,PACKING STRIP,IODOFORM,1/2"X5YD,ST: Brand: CURAD

## (undated) DEVICE — SOLUTION IRRIG 1000ML STRL H2O USP PLAS POUR BTL

## (undated) DEVICE — BASIC GENERAL-SFMC: Brand: MEDLINE INDUSTRIES, INC.

## (undated) DEVICE — UNDERPANTS MAT L/XL KNIT SEAMLESS CLR CODE WAISTBAND

## (undated) DEVICE — BLUNT CANNULA: Brand: MONOJECT

## (undated) DEVICE — LINER,SEMI-RIGID,3000CC,50EA/CS: Brand: MEDLINE

## (undated) DEVICE — KIT ENDO OP4 CA 1.1+ BX20

## (undated) DEVICE — SYRINGE 20ML LL S/C 50

## (undated) DEVICE — PAD,ABDOMINAL,5"X9",ST,LF,25/BX: Brand: MEDLINE INDUSTRIES, INC.

## (undated) DEVICE — GOWN,ISO,KNITCUFF, PREMIUM BLUE, LV3,XL: Brand: MEDLINE INDUSTRIES, INC.

## (undated) DEVICE — DRAPE,LAPAROTOMY,PED,STERILE: Brand: MEDLINE

## (undated) DEVICE — SUTURE VCRL + SZ 2-0 L18IN ABSRB VLT L26MM SH 1/2 CIR VCP775D

## (undated) DEVICE — GLOVE ORANGE PI 7   MSG9070

## (undated) DEVICE — STRL PENROSE DRAIN 18" X 1/2": Brand: CARDINAL HEALTH

## (undated) DEVICE — SUTURE NONABSORBABLE MONOFILAMENT 2-0 FS 18 IN ETHILON 664H

## (undated) DEVICE — SOLUTION SCRB 2OZ 10% POVIDONE IOD ANTIMIC BTL

## (undated) DEVICE — DISSECTOR ENDOSCP L21CM TIP CURVATURE 40DEG FN CRV JAW VES

## (undated) DEVICE — SPONGE GZ W4XL4IN COT 12 PLY TYP VII WVN C FLD DSGN STERILE

## (undated) DEVICE — TOWEL SURG W17XL27IN STD BLU COT NONFENESTRATED PREWASHED

## (undated) DEVICE — TAPE,CLOTH/SILK,CURAD,3"X10YD,LF,40/CS: Brand: CURAD

## (undated) DEVICE — BLADE CLIPPER GEN PURP NS

## (undated) DEVICE — HANDPIECE SET WITH COAXIAL HIGH FLOW TIP AND SUCTION TUBE: Brand: INTERPULSE

## (undated) DEVICE — CATHETER,URETHRAL,POLY,LATEX FREE,14FR: Brand: MEDLINE